# Patient Record
Sex: MALE | Race: WHITE | NOT HISPANIC OR LATINO | Employment: FULL TIME | URBAN - METROPOLITAN AREA
[De-identification: names, ages, dates, MRNs, and addresses within clinical notes are randomized per-mention and may not be internally consistent; named-entity substitution may affect disease eponyms.]

---

## 2017-07-26 ENCOUNTER — ALLSCRIPTS OFFICE VISIT (OUTPATIENT)
Dept: OTHER | Facility: OTHER | Age: 47
End: 2017-07-26

## 2017-07-26 DIAGNOSIS — Z00.00 ENCOUNTER FOR GENERAL ADULT MEDICAL EXAMINATION WITHOUT ABNORMAL FINDINGS: ICD-10-CM

## 2017-07-26 DIAGNOSIS — Z12.5 ENCOUNTER FOR SCREENING FOR MALIGNANT NEOPLASM OF PROSTATE: ICD-10-CM

## 2017-12-28 ENCOUNTER — APPOINTMENT (OUTPATIENT)
Dept: LAB | Facility: HOSPITAL | Age: 47
End: 2017-12-28
Payer: COMMERCIAL

## 2017-12-28 DIAGNOSIS — Z00.00 ENCOUNTER FOR GENERAL ADULT MEDICAL EXAMINATION WITHOUT ABNORMAL FINDINGS: ICD-10-CM

## 2017-12-28 DIAGNOSIS — Z12.5 ENCOUNTER FOR SCREENING FOR MALIGNANT NEOPLASM OF PROSTATE: ICD-10-CM

## 2017-12-28 LAB
ALBUMIN SERPL BCP-MCNC: 4 G/DL (ref 3.5–5)
ALP SERPL-CCNC: 69 U/L (ref 46–116)
ALT SERPL W P-5'-P-CCNC: 35 U/L (ref 12–78)
ANION GAP SERPL CALCULATED.3IONS-SCNC: 5 MMOL/L (ref 4–13)
AST SERPL W P-5'-P-CCNC: 23 U/L (ref 5–45)
BASOPHILS # BLD AUTO: 0.03 THOUSANDS/ΜL (ref 0–0.1)
BASOPHILS NFR BLD AUTO: 1 % (ref 0–1)
BILIRUB SERPL-MCNC: 0.66 MG/DL (ref 0.2–1)
BUN SERPL-MCNC: 19 MG/DL (ref 5–25)
CALCIUM SERPL-MCNC: 9.5 MG/DL (ref 8.3–10.1)
CHLORIDE SERPL-SCNC: 104 MMOL/L (ref 100–108)
CHOLEST SERPL-MCNC: 206 MG/DL (ref 50–200)
CO2 SERPL-SCNC: 30 MMOL/L (ref 21–32)
CREAT SERPL-MCNC: 0.95 MG/DL (ref 0.6–1.3)
EOSINOPHIL # BLD AUTO: 0.07 THOUSAND/ΜL (ref 0–0.61)
EOSINOPHIL NFR BLD AUTO: 1 % (ref 0–6)
ERYTHROCYTE [DISTWIDTH] IN BLOOD BY AUTOMATED COUNT: 12.7 % (ref 11.6–15.1)
GFR SERPL CREATININE-BSD FRML MDRD: 96 ML/MIN/1.73SQ M
GLUCOSE SERPL-MCNC: 99 MG/DL (ref 65–140)
HCT VFR BLD AUTO: 45.1 % (ref 36.5–49.3)
HDLC SERPL-MCNC: 94 MG/DL (ref 40–60)
HGB BLD-MCNC: 15.3 G/DL (ref 12–17)
LDLC SERPL CALC-MCNC: 99 MG/DL (ref 0–100)
LYMPHOCYTES # BLD AUTO: 1.93 THOUSANDS/ΜL (ref 0.6–4.47)
LYMPHOCYTES NFR BLD AUTO: 39 % (ref 14–44)
MCH RBC QN AUTO: 31.6 PG (ref 26.8–34.3)
MCHC RBC AUTO-ENTMCNC: 33.9 G/DL (ref 31.4–37.4)
MCV RBC AUTO: 93 FL (ref 82–98)
MONOCYTES # BLD AUTO: 0.42 THOUSAND/ΜL (ref 0.17–1.22)
MONOCYTES NFR BLD AUTO: 8 % (ref 4–12)
NEUTROPHILS # BLD AUTO: 2.55 THOUSANDS/ΜL (ref 1.85–7.62)
NEUTS SEG NFR BLD AUTO: 51 % (ref 43–75)
NRBC BLD AUTO-RTO: 0 /100 WBCS
PLATELET # BLD AUTO: 303 THOUSANDS/UL (ref 149–390)
PMV BLD AUTO: 9.7 FL (ref 8.9–12.7)
POTASSIUM SERPL-SCNC: 4.6 MMOL/L (ref 3.5–5.3)
PROT SERPL-MCNC: 7.7 G/DL (ref 6.4–8.2)
PSA SERPL-MCNC: 1.2 NG/ML (ref 0–4)
RBC # BLD AUTO: 4.84 MILLION/UL (ref 3.88–5.62)
SODIUM SERPL-SCNC: 139 MMOL/L (ref 136–145)
TRIGL SERPL-MCNC: 63 MG/DL
WBC # BLD AUTO: 5.01 THOUSAND/UL (ref 4.31–10.16)

## 2017-12-28 PROCEDURE — 36415 COLL VENOUS BLD VENIPUNCTURE: CPT

## 2017-12-28 PROCEDURE — 80053 COMPREHEN METABOLIC PANEL: CPT

## 2017-12-28 PROCEDURE — G0103 PSA SCREENING: HCPCS

## 2017-12-28 PROCEDURE — 80061 LIPID PANEL: CPT

## 2017-12-28 PROCEDURE — 85025 COMPLETE CBC W/AUTO DIFF WBC: CPT

## 2017-12-29 ENCOUNTER — GENERIC CONVERSION - ENCOUNTER (OUTPATIENT)
Dept: OTHER | Facility: OTHER | Age: 47
End: 2017-12-29

## 2018-01-10 NOTE — PROGRESS NOTES
Assessment    1  Encounter for preventive health examination (V70 0) (Z00 00)   2  Blood tests for routine general physical examination (V72 62) (Z00 00)    Plan  Blood tests for routine general physical examination, Health Maintenance    · (1) CBC/PLT/DIFF; Status:Active; Requested for:[id number];    · (1) COMPREHENSIVE METABOLIC PANEL; Status:Active; Requested for:[id number];    · (1) LIPID PANEL, FASTING; Status:Active; Requested for:64Hgk1430;   Blood tests for routine general physical examination, Health Maintenance, Encounter for  prostate cancer screening    · (1) PSA (SCREEN) (Dx V76 44 Screen for Prostate Cancer); Status:Active; Requested  for:87Wqu7734; Health Maintenance    · Adacel 5-2-15 5 LF-MCG/0 5 Intramuscular Suspension; INJECT 0 5  ML  Intramuscular; To Be Done: [id number]    Discussion/Summary  Impression: health maintenance visit, healthy adult male  Currently, he eats an adequate diet and has an adequate exercise regimen  Prostate cancer screening: the risks and benefits of prostate cancer screening were discussed and PSA was ordered  Colorectal cancer screening: the risks and benefits of colorectal cancer screening were discussed and colorectal cancer screening is not indicated  Screening lab work includes glucose, lipid profile, 25-hydroxyvitamin D and urinalysis  The risks and benefits of immunizations were discussed and immunizations are needed  He was advised to be evaluated by an ophthalmologist  Advice and education were given regarding nutrition, aerobic exercise, sunscreen use and self skin examination  Patient given lab requisition for fasting labs as above  Will heed results  Patient received updated Tdap Vaccine today  Discussed proper nutrition and regular aerobic exercise  Patient to return to the office in 1 year  The treatment plan was reviewed with the patient/guardian   The patient/guardian understands and agrees with the treatment plan      Chief Complaint  Physical History of Present Illness  HM, Adult Male: The patient is being seen for a health maintenance evaluation  General Health: The patient's health since the last visit is described as good  He has regular dental visits  He complains of vision problems  Vision care includes wearing reading glasses  The patient complains of difficulty reading  He denies hearing loss  Immunizations status: not up to date  Lifestyle:  He does not have a healthy diet  He does not have any weight concerns  He does not exercise regularly  He does not use tobacco  He consumes alcohol  He reports occasional alcohol use  He denies drug use  Screening:   metabolic screening reviewed and updated  HPI: Patient is a 71-year-old male who is here for annual physical exam he is not fasting today  Patient has been doing well overall  No regular exercise program although patient remains very physically active at work as a  and doing yard work  Patient's wife was concerned about possible weight loss as his pants were fitting looser although on our scale there is been no discernible weight loss  Patient admits to family history of heart disease and diabetes but denies family history of colon cancer or prostate cancer  Review of Systems    Constitutional: no fever, not feeling poorly, no chills and not feeling tired  Eyes: No complaints of eye pain, no red eyes, no discharge from eyes, no itchy eyes  ENT: no complaints of earache, no hearing loss, no nosebleeds, no nasal discharge, no sore throat, no hoarseness  Cardiovascular: no chest pain, no intermittent leg claudication, no palpitations and no extremity edema  Respiratory: No complaints of shortness of breath, no wheezing, no cough, no SOB on exertion, no orthopnea or PND  Gastrointestinal: No complaints of abdominal pain, no constipation, no nausea or vomiting, no diarrhea or bloody stools  Genitourinary: no dysuria, no urinary hesitancy and no nocturia  Musculoskeletal: No complaints of arthralgia, no myalgias, no joint swelling or stiffness, no limb pain or swelling  Integumentary: No complaints of skin rash or skin lesions, no itching, no skin wound, no dry skin  Neurological: no headache, no numbness, no tingling, no dizziness, no limb weakness, no fainting and no difficulty walking  Psychiatric: no anxiety, no sleep disturbances and no depression  Endocrine: no muscle weakness and no feelings of weakness  Hematologic/Lymphatic: No complaints of swollen glands, no swollen glands in the neck, does not bleed easily, no easy bruising  Active Problems    1  Encounter for removal of sutures (V58 32) (Z48 02)   2  Laceration of face (873 40) (S01 81XA)   3  Tick bite (919 4,E906 4) (W57 XXXA)    Past Medical History    · History of Acute pharyngitis (462) (J02 9)   · History of Acute serous otitis media, unspecified laterality   · History of Acute sinusitis (461 9) (J01 90)   · History of Atopic dermatitis (691 8) (L20 9)   · History of Impacted cerumen, unspecified laterality   · History of Sore throat (462) (J02 9)   · History of Strain of thoracic region (847 1) (S29 019A)    Surgical History    · History of Surgery Vas Deferens Vasectomy    Family History  Maternal Grandmother    · Family history of Diabetes Mellitus (V18 0)  Maternal Uncle    · Family history of Diabetes Mellitus (V18 0)  Family History    · Denied: Family history of Colon Cancer   · Denied: Family history of Myocardial Infarction Arrhythmias   · Denied: Family history of Prostate Cancer   · Denied: Family history of Stroke Syndrome    Social History    · Being A Social Drinker   · Caffeine Use   · 2 cups coffee/d   · Denied: History of Drug use   · Never A Smoker    Current Meds   1  No Reported Medications Recorded    Allergies    1   No Known Drug Allergies    Vitals   Recorded: [id number] 04:31PM Recorded: 39The7372 04:00PM   Temperature  98 2 F   Heart Rate 72    Respiration 16    Systolic 975 201   Diastolic 74 64   Height  6 ft 1 in   Weight  198 lb    BMI Calculated  26 12   BSA Calculated  2 14     Physical Exam    Constitutional   General appearance: No acute distress, well appearing and well nourished  Eyes   Conjunctiva and lids: No swelling, erythema, or discharge  Ears, Nose, Mouth, and Throat   External inspection of ears and nose: Normal     Otoscopic examination: Tympanic membrance translucent with normal light reflex  Canals patent without erythema  Oropharynx: Normal with no erythema, edema, exudate or lesions  Pulmonary   Respiratory effort: No increased work of breathing or signs of respiratory distress  Auscultation of lungs: Clear to auscultation  Cardiovascular   Auscultation of heart: Normal rate and rhythm, normal S1 and S2, without murmurs  Examination of extremities for edema and/or varicosities: Normal     Abdomen   Abdomen: Non-tender, no masses  Lymphatic   Palpation of lymph nodes in neck: No lymphadenopathy  Musculoskeletal   Gait and station: Normal     Inspection/palpation of joints, bones, and muscles: Normal     Skin   Skin and subcutaneous tissue: Normal without rashes or lesions  Psychiatric   Orientation to person, place and time: Normal     Mood and affect: Normal        Results/Data  PHQ-2 Adult Depression Screening 34Lln7216 04:04PM User, s     Test Name Result Flag Reference   PHQ-2 Adult Depression Score 2     Over the last two weeks, how often have you been bothered by any of the following problems?   Little interest or pleasure in doing things: Several days - 1  Feeling down, depressed, or hopeless: Several days - 1   PHQ-2 Adult Depression Screening Negative         Signatures   Electronically signed by : Karel Mckoy DO; Jul 26 2017  4:44PM EST                       (Author)

## 2018-01-15 VITALS
WEIGHT: 198 LBS | RESPIRATION RATE: 16 BRPM | DIASTOLIC BLOOD PRESSURE: 74 MMHG | HEART RATE: 72 BPM | SYSTOLIC BLOOD PRESSURE: 110 MMHG | TEMPERATURE: 98.2 F | HEIGHT: 73 IN | BODY MASS INDEX: 26.24 KG/M2

## 2018-01-17 NOTE — RESULT NOTES
Message   Tick negative for Lyme disease  Verified Results  (Q) TICK ID W/REFLEX TO LYME DISEASE DNA 25Apr2016 12:00AM Destiny Jin     Test Name Result Flag Reference   TICK ID W/REFLEX TO LYME$DISEASE DNA      Ixodes scapularis ([place] Tick)     LYME DISEASE ([facility]) DNA, QL, TICK 25Apr2016 12:00AM Destiny Jin     Test Name Result Flag Reference   LYME DISEASE ([place] [facility]) DNA, QL, TICK NOT DETECTED     REFERENCE RANGE: NOT DETECTED     This test was developed and its analytical performance  characteristics have been determined by [employer]  It has not been cleared or approved by the [place]  Food and  Drug Administration  The FDA has determined that such  clearance or approval is not necessary  This assay has  been validated pursuant to the CLIA regulations and is  used for clinical purposes

## 2018-01-23 NOTE — RESULT NOTES
Discussion/Summary   Labs are normal      Verified Results  (1) CBC/PLT/DIFF [id number] 04:57PM Laura Mcleod Order Number: [id number]     Test Name Result Flag Reference   WBC COUNT 5 01 Thousand/uL  4 31-10 16   RBC COUNT 4 84 Million/uL  3 88-5 62   HEMOGLOBIN 15 3 g/dL  12 0-17 0   HEMATOCRIT 45 1 %  36 5-49 3   MCV 93 fL  82-98   MCH 31 6 pg  26 8-34 3   MCHC 33 9 g/dL  31 4-37 4   RDW 12 7 %  11 6-15 1   MPV 9 7 fL  8 9-12 7   PLATELET COUNT 079 Thousands/uL  149-390   nRBC AUTOMATED 0 /100 WBCs     NEUTROPHILS RELATIVE PERCENT 51 %  43-75   LYMPHOCYTES RELATIVE PERCENT 39 %  14-44   MONOCYTES RELATIVE PERCENT 8 %  4-12   EOSINOPHILS RELATIVE PERCENT 1 %  0-6   BASOPHILS RELATIVE PERCENT 1 %  0-1   NEUTROPHILS ABSOLUTE COUNT 2 55 Thousands/? ??L  1 85-7 62   LYMPHOCYTES ABSOLUTE COUNT 1 93 Thousands/? ??L  0 60-4 47   MONOCYTES ABSOLUTE COUNT 0 42 Thousand/? ??L  0 17-1 22   EOSINOPHILS ABSOLUTE COUNT 0 07 Thousand/? ??L  0 00-0 61   BASOPHILS ABSOLUTE COUNT 0 03 Thousands/? ??L  0 00-0 10     (1) COMPREHENSIVE METABOLIC PANEL [id number] 92:14LO Laura Mcleod Order Number: [id number]     Test Name Result Flag Reference   GLUCOSE,RANDM 99 mg/dL     If the patient is fasting, the ADA then defines impaired fasting glucose as > 100 mg/dL and diabetes as > or equal to 123 mg/dL  Specimen collection should occur prior to Sulfasalazine administration due to the potential for falsely depressed results  Specimen collection should occur prior to Sulfapyridine administration due to the potential for falsely elevated results     SODIUM 139 mmol/L  136-145   POTASSIUM 4 6 mmol/L  3 5-5 3   CHLORIDE 104 mmol/L  100-108   CARBON DIOXIDE 30 mmol/L  21-32   ANION GAP (CALC) 5 mmol/L  4-13   BLOOD UREA NITROGEN 19 mg/dL  5-25   CREATININE 0 95 mg/dL  0 60-1 30   Standardized to IDMS reference method   CALCIUM 9 5 mg/dL  8 3-10 1   BILI, TOTAL 0 66 mg/dL  0 20-1 00   ALK PHOSPHATAS 69 U/L    ALT (SGPT) 35 U/L  12-78   Specimen collection should occur prior to Sulfasalazine and/or Sulfapyridine administration due to the potential for falsely depressed results  AST(SGOT) 23 U/L  5-45   Specimen collection should occur prior to Sulfasalazine administration due to the potential for falsely depressed results  ALBUMIN 4 0 g/dL  3 5-5 0   TOTAL PROTEIN 7 7 g/dL  6 4-8 2   eGFR 96 ml/min/1 73sq [facility] Disease Education Program recommendations are as follows:  GFR calculation is accurate only with a steady state creatinine  Chronic Kidney disease less than 60 ml/min/1 73 sq  meters  Kidney failure less than 15 ml/min/1 73 sq  meters  (1) LIPID PANEL, FASTING 28Dec2017 04:57PM SoftLayermeaghan Simon Order Number: [id number]     Test Name Result Flag Reference   CHOLESTEROL 206 mg/dL H    HDL,DIRECT 94 mg/dL H 40-60   Specimen collection should occur prior to Metamizole administration due to the potential for falsley depressed results  LDL CHOLESTEROL CALCULATED 99 mg/dL  0-100   Triglyceride:        Normal <150 mg/dl   Borderline High 150-199 mg/dl   High 200-499 mg/dl   Very High >499 mg/dl      Cholesterol:       Desirable <200 mg/dl    Borderline High 200-239 mg/dl    High >239 mg/dl      HDL Cholesterol:       High>59 mg/dL    Low <41 mg/dL      This screening LDL is a calculated result  It does not have the accuracy of the Direct Measured LDL in the monitoring of patients with hyperlipidemia and/or statin therapy  Direct Measure LDL (PTW260) must be ordered separately in these patients  TRIGLYCERIDES 63 mg/dL  <=150   Specimen collection should occur prior to N-Acetylcysteine or Metamizole administration due to the potential for falsely depressed results       (1) PSA (SCREEN) (Dx V76 44 Screen for Prostate Cancer) 28Dec2017 04:57PM SoftLayermeaghan Simon Order Number: [id number]     Test Name Result Flag Reference   PROSTATE SPECIFIC ANTIGEN 1 2 ng/mL  0 0-4 0   American Urological Association Guidelines define biochemical recurrence of prostate cancer as a detectable or rising PSA value post-radical prostatectomy that is greater than or equal to 0 2 ng/mL with a second confirmatory level of greater than or equal to 0 2 ng/mL

## 2018-07-09 ENCOUNTER — HOSPITAL ENCOUNTER (OUTPATIENT)
Dept: NON INVASIVE DIAGNOSTICS | Facility: HOSPITAL | Age: 48
Discharge: HOME | End: 2018-07-09
Payer: COMMERCIAL

## 2018-07-09 ENCOUNTER — OFFICE VISIT (OUTPATIENT)
Dept: FAMILY MEDICINE CLINIC | Facility: CLINIC | Age: 48
End: 2018-07-09
Payer: COMMERCIAL

## 2018-07-09 VITALS
RESPIRATION RATE: 16 BRPM | HEIGHT: 72 IN | BODY MASS INDEX: 27.09 KG/M2 | DIASTOLIC BLOOD PRESSURE: 80 MMHG | TEMPERATURE: 98.9 F | SYSTOLIC BLOOD PRESSURE: 124 MMHG | HEART RATE: 72 BPM | WEIGHT: 200 LBS

## 2018-07-09 DIAGNOSIS — I80.9 PHLEBITIS: Primary | ICD-10-CM

## 2018-07-09 DIAGNOSIS — I80.9 PHLEBITIS: ICD-10-CM

## 2018-07-09 PROCEDURE — 93971 EXTREMITY STUDY: CPT | Performed by: SURGERY

## 2018-07-09 PROCEDURE — 93971 EXTREMITY STUDY: CPT

## 2018-07-09 PROCEDURE — 99213 OFFICE O/P EST LOW 20 MIN: CPT | Performed by: FAMILY MEDICINE

## 2018-07-09 RX ORDER — CEPHALEXIN 500 MG/1
500 CAPSULE ORAL
COMMUNITY
Start: 2018-07-03 | End: 2018-07-10

## 2018-07-09 NOTE — PROGRESS NOTES
Assessment/Plan:    Patient will be scheduled for venous duplex of the lower extremities stat  Will heed results  If negative for DVT then recommend Motrin, rest, leg elevation and support stockings  If positive for DVT will require anticoagulation  This was discussed with patient and wife at length  Return to the office in 1 week or sooner rafa Manzano [facility] Diagnoses and all orders for this visit:    Phlebitis  Comments:  Schedule venous duplex of the lower extremities stat  Orders:  -     VAS lower limb venous duplex study, complete bilateral; Future    Other orders  -     cephalexin (KEFLEX) 500 mg capsule; Take 500 mg by mouth  -     diclofenac sodium (VOLTAREN) 1 %; Apply 1 application topically          Subjective:      Patient ID: Long Fishman is a 53 y o  male  Patient is being seen in follow-up from an urgent care visit at [facility] on 07/03/2018 for diagnosis of left leg phlebitis  Patient was treated with cephalexin and diclofenac gel  Patient complains of left leg pain and swelling for the past 10 days  He admits to pain initially anterior long shin and medially into the calf and now behind the knee  Patient has treated this with warm compresses and leg elevation without significant improvement  Patient has been wearing support stockings with some improvement in the swelling  Patient works as a  standing on concrete floor  He denies any specific injury  He denies prior history of DVT  Leg Pain    The incident occurred more than 1 week ago  There was no injury mechanism  The pain is present in the left leg  The quality of the pain is described as stabbing  The pain has been constant since onset  Pertinent negatives include no inability to bear weight, muscle weakness, numbness or tingling  The symptoms are aggravated by weight bearing  He has tried heat, rest, elevation and NSAIDs for the symptoms  The treatment provided no relief         The following portions of the patient's history were reviewed and updated as appropriate: allergies, current medications, past family history, past medical history, past social history, past surgical history and problem list     Review of Systems   Neurological: Negative for tingling and numbness  Objective:      /80   Pulse 72   Temp 98 9 °F (37 2 °C) (Tympanic)   Resp 16   Ht 6' (1 829 m)   Wt 90 7 kg (200 lb)   BMI 27 12 kg/m²          Physical Exam   Constitutional: He is oriented to person, place, and time  He appears well-developed and well-nourished  No distress  HENT:   Head: Normocephalic  Mouth/Throat: Oropharynx is clear and moist    Eyes: Conjunctivae are normal  No scleral icterus  Neck: Neck supple  Cardiovascular: Normal rate and regular rhythm  Pulmonary/Chest: Effort normal and breath sounds normal    Abdominal: Soft  There is no tenderness  Musculoskeletal: He exhibits edema and tenderness  Left leg reveals tenderness anterior long the shin, proximal medial thigh and popliteal area  Positive erythema, swelling and induration of the vein  Negative calf tenderness and negative Hiram's sign  Lymphadenopathy:     He has no cervical adenopathy  Neurological: He is alert and oriented to person, place, and time  Skin: Skin is warm and dry  Psychiatric: He has a normal mood and affect

## 2018-07-10 ENCOUNTER — TELEPHONE (OUTPATIENT)
Dept: FAMILY MEDICINE CLINIC | Facility: CLINIC | Age: 48
End: 2018-07-10

## 2018-07-10 NOTE — TELEPHONE ENCOUNTER
Natasha Kovacs from Vascular lab left voicemail for Vas lower limb venous duplex and states no DVT  There is acute superficial thrombophlebitis in the great saphenous vein from  the knee to the mid calf

## 2018-09-13 ENCOUNTER — OFFICE VISIT (OUTPATIENT)
Dept: FAMILY MEDICINE CLINIC | Facility: CLINIC | Age: 48
End: 2018-09-13
Payer: COMMERCIAL

## 2018-09-13 VITALS
HEIGHT: 73 IN | HEART RATE: 83 BPM | DIASTOLIC BLOOD PRESSURE: 70 MMHG | WEIGHT: 202 LBS | BODY MASS INDEX: 26.77 KG/M2 | TEMPERATURE: 96.7 F | RESPIRATION RATE: 16 BRPM | SYSTOLIC BLOOD PRESSURE: 100 MMHG | OXYGEN SATURATION: 98 %

## 2018-09-13 DIAGNOSIS — J02.9 PHARYNGITIS, UNSPECIFIED ETIOLOGY: Primary | ICD-10-CM

## 2018-09-13 DIAGNOSIS — R50.9 FEVER AND CHILLS: ICD-10-CM

## 2018-09-13 LAB — S PYO AG THROAT QL: NEGATIVE

## 2018-09-13 PROCEDURE — 1036F TOBACCO NON-USER: CPT | Performed by: FAMILY MEDICINE

## 2018-09-13 PROCEDURE — 87880 STREP A ASSAY W/OPTIC: CPT | Performed by: FAMILY MEDICINE

## 2018-09-13 PROCEDURE — 3008F BODY MASS INDEX DOCD: CPT | Performed by: FAMILY MEDICINE

## 2018-09-13 PROCEDURE — 99213 OFFICE O/P EST LOW 20 MIN: CPT | Performed by: FAMILY MEDICINE

## 2018-09-13 RX ORDER — AZITHROMYCIN 250 MG/1
TABLET, FILM COATED ORAL
Qty: 6 TABLET | Refills: 0 | Status: SHIPPED | OUTPATIENT
Start: 2018-09-13 | End: 2018-09-17

## 2018-09-13 NOTE — PROGRESS NOTES
Assessment/Plan:    Discussed diagnostic and treatment options with patient and wife  Rapid strep test is negative  Patient will be started on a Z-Abdoulaye and instructed to continue ibuprofen 600 mg every 6 hours p lon Bar Patient encouraged to increase fluids and rest   Recommend chest x-ray if fever persists for 24-48 hours  Return to the office in 1 week or sooner rafa Bar Diagnoses and all orders for this visit:    Pharyngitis, unspecified etiology  Comments:  Rapid strep test is negative  Z-Abdoulaye and ibuprofen p r n   Increase fluids and rest   Orders:  -     POCT rapid strepA  -     azithromycin (ZITHROMAX) 250 mg tablet; Take 2 tablets today then 1 tablet daily x 4 days    Fever and chills  Comments:  Ibuprofen p r n     Chest x-ray in 1-2 days if not improving  Orders:  -     XR chest pa & lateral; Future    Other orders  -     ibuprofen (ADVIL,MOTRIN) 100 MG tablet; Take 100 mg by mouth every 6 (six) hours as needed for mild pain          Subjective:      [facility] 53 y o  [facility]  Patient started 4 days ago with sore throat and the past 3 days complains of fever to 101  He admits to chills, sweats and body aches  Patient admits to mild congestion and nonproductive cough  Patient has been tolerating fluids well  He has been treating this with ibuprofen every 6 hours with some relief  Sore Throat    This is a new problem  The current episode started in the past 7 days  The maximum temperature recorded prior to his arrival was 101 - 101 9 F  Associated symptoms include congestion, coughing and headaches  Pertinent negatives include no abdominal pain, diarrhea, ear pain, hoarse voice, plugged ear sensation, neck pain, shortness of breath, swollen glands, trouble swallowing or vomiting  He has had no exposure to strep  He has tried NSAIDs and cool liquids for the symptoms  The treatment provided mild relief     Generalized Body Aches   Associated symptoms include congestion, headaches, a sore throat and coughing  Pertinent negatives include no ear pain, swollen glands, shortness of breath, abdominal pain, diarrhea, vomiting or neck pain  The following portions of the patient's history were reviewed and updated as appropriate: allergies, current medications, past family history, past medical history, past social history, past surgical history and problem list     Review of Systems   HENT: Positive for congestion and sore throat  Negative for ear pain, hoarse voice and trouble swallowing  Respiratory: Positive for cough  Negative for shortness of breath  Gastrointestinal: Negative for abdominal pain, diarrhea and vomiting  Musculoskeletal: Negative for neck pain  Neurological: Positive for headaches  Objective:      /70 (BP Location: Left arm, Patient Position: Sitting, Cuff Size: Adult)   Pulse 83   Temp (!) 96 7 °F (35 9 °C)   Resp 16   Ht 6' 0 83" (1 85 m)   Wt 91 6 kg (202 lb)   SpO2 98%   BMI 26 77 kg/m²          Physical Exam   Constitutional: He is oriented to person, place, and time  He appears well-developed and well-nourished  No distress  HENT:   Head: Normocephalic  Right Ear: External ear normal    Left Ear: External ear normal    Mouth/Throat: No oropharyngeal exudate  Mild turbinates swelling with mucoid drainage  Throat positive erythema  Mucous membranes moist    Eyes: Conjunctivae are normal  No scleral icterus  Neck: Neck supple  Cardiovascular: Normal rate and regular rhythm  Pulmonary/Chest: Effort normal and breath sounds normal  No respiratory distress  He has no wheezes  Abdominal: Soft  There is no tenderness  Musculoskeletal: He exhibits no edema  Lymphadenopathy:     He has no cervical adenopathy  Neurological: He is alert and oriented to person, place, and time  Skin: Skin is warm and dry  He is not diaphoretic  Psychiatric: He has a normal mood and affect

## 2019-11-20 ENCOUNTER — OFFICE VISIT (OUTPATIENT)
Dept: FAMILY MEDICINE CLINIC | Facility: CLINIC | Age: 49
End: 2019-11-20
Payer: COMMERCIAL

## 2019-11-20 VITALS
HEART RATE: 84 BPM | SYSTOLIC BLOOD PRESSURE: 116 MMHG | WEIGHT: 192 LBS | RESPIRATION RATE: 16 BRPM | TEMPERATURE: 98.9 F | HEIGHT: 74 IN | OXYGEN SATURATION: 98 % | DIASTOLIC BLOOD PRESSURE: 70 MMHG | BODY MASS INDEX: 24.64 KG/M2

## 2019-11-20 DIAGNOSIS — J06.9 UPPER RESPIRATORY TRACT INFECTION, UNSPECIFIED TYPE: Primary | ICD-10-CM

## 2019-11-20 PROCEDURE — 1036F TOBACCO NON-USER: CPT | Performed by: FAMILY MEDICINE

## 2019-11-20 PROCEDURE — 99213 OFFICE O/P EST LOW 20 MIN: CPT | Performed by: FAMILY MEDICINE

## 2019-11-20 RX ORDER — AZITHROMYCIN 250 MG/1
TABLET, FILM COATED ORAL
Qty: 6 TABLET | Refills: 0 | Status: SHIPPED | OUTPATIENT
Start: 2019-11-20 | End: 2019-11-24

## 2019-11-20 NOTE — PROGRESS NOTES
Assessment/Plan:  Patient was started on a Z-Abdoulaye and instructed to take Robitussin or Mucinex p r n  He may continue Advil p r n  He is encouraged to drink plenty of fluids and rest   Return to the office in 1 week or sooner p r n  Hermilo Kovacs Diagnoses and all orders for this visit:    Upper respiratory tract infection, unspecified type  -     azithromycin (ZITHROMAX) 250 mg tablet; Take 2 tablets today then 1 tablet daily x 4 days          Subjective:      Patient ID: Ute Lutz is a 51 y o  male  Patient complains of cold symptoms for the past 4 days  He complains of sore throat, nasal congestion and cough  He admits to headache and feeling feverish with chills and sweats  He has been treating this with Advil with some relief  URI    This is a new problem  The current episode started in the past 7 days  The problem has been gradually worsening  There has been no fever  Associated symptoms include congestion, coughing, headaches, rhinorrhea, sinus pain, sneezing and a sore throat  Pertinent negatives include no ear pain, plugged ear sensation or wheezing  He has tried NSAIDs and increased fluids for the symptoms  The treatment provided mild relief  The following portions of the patient's history were reviewed and updated as appropriate: allergies, current medications, past family history, past medical history, past social history, past surgical history and problem list     Review of Systems   HENT: Positive for congestion, rhinorrhea, sinus pain, sneezing and sore throat  Negative for ear pain  Respiratory: Positive for cough  Negative for wheezing  Neurological: Positive for headaches           Objective:      /70 (BP Location: Left arm, Patient Position: Sitting, Cuff Size: Adult)   Pulse 84   Temp 98 9 °F (37 2 °C) (Oral)   Resp 16   Ht 6' 1 62" (1 87 m)   Wt 87 1 kg (192 lb)   SpO2 98%   BMI 24 91 kg/m²          Physical Exam   Constitutional: He is oriented to person, place, and time  He appears well-developed and well-nourished  HENT:   Head: Normocephalic  Right Ear: External ear normal    Left Ear: External ear normal    Turbinates swelling with mucoid drainage  Throat postnasal drainage and erythema  Mucous membranes moist    Eyes: Conjunctivae are normal  No scleral icterus  Neck: Neck supple  Cardiovascular: Normal rate and regular rhythm  Pulmonary/Chest: Effort normal and breath sounds normal    Abdominal: Soft  There is no tenderness  Musculoskeletal: He exhibits no edema  Lymphadenopathy:     He has no cervical adenopathy  Neurological: He is alert and oriented to person, place, and time  Skin: Skin is warm and dry  Psychiatric: He has a normal mood and affect

## 2020-10-29 ENCOUNTER — CLINICAL SUPPORT (OUTPATIENT)
Dept: FAMILY MEDICINE CLINIC | Facility: CLINIC | Age: 50
End: 2020-10-29
Payer: COMMERCIAL

## 2020-10-29 DIAGNOSIS — Z23 NEED FOR INFLUENZA VACCINATION: Primary | ICD-10-CM

## 2020-10-29 PROCEDURE — 90471 IMMUNIZATION ADMIN: CPT

## 2020-10-29 PROCEDURE — 90686 IIV4 VACC NO PRSV 0.5 ML IM: CPT

## 2021-08-24 ENCOUNTER — RA CDI HCC (OUTPATIENT)
Dept: OTHER | Facility: HOSPITAL | Age: 51
End: 2021-08-24

## 2021-08-24 NOTE — PROGRESS NOTES
[facility] [[email or web address]] 75  coding opportunities       Chart reviewed, no opportunity found: CHART REVIEWED, NO OPPORTUNITY FOUND                        Patients insurance company: [employer] (Medicare Advantage and Commercial)

## 2021-08-31 ENCOUNTER — OFFICE VISIT (OUTPATIENT)
Dept: FAMILY MEDICINE CLINIC | Facility: CLINIC | Age: 51
End: 2021-08-31
Payer: COMMERCIAL

## 2021-08-31 VITALS
OXYGEN SATURATION: 99 % | HEART RATE: 69 BPM | HEIGHT: 72 IN | BODY MASS INDEX: 25.3 KG/M2 | SYSTOLIC BLOOD PRESSURE: 118 MMHG | TEMPERATURE: 97.6 F | WEIGHT: 186.8 LBS | DIASTOLIC BLOOD PRESSURE: 70 MMHG

## 2021-08-31 DIAGNOSIS — R53.83 FATIGUE, UNSPECIFIED TYPE: ICD-10-CM

## 2021-08-31 DIAGNOSIS — Z12.5 SCREENING FOR PROSTATE CANCER: ICD-10-CM

## 2021-08-31 DIAGNOSIS — Z12.11 COLON CANCER SCREENING: ICD-10-CM

## 2021-08-31 DIAGNOSIS — Z00.00 WELL ADULT EXAM: Primary | ICD-10-CM

## 2021-08-31 PROCEDURE — 1036F TOBACCO NON-USER: CPT | Performed by: FAMILY MEDICINE

## 2021-08-31 PROCEDURE — 99396 PREV VISIT EST AGE 40-64: CPT | Performed by: FAMILY MEDICINE

## 2021-08-31 PROCEDURE — 3725F SCREEN DEPRESSION PERFORMED: CPT | Performed by: FAMILY MEDICINE

## 2021-08-31 PROCEDURE — 3008F BODY MASS INDEX DOCD: CPT | Performed by: FAMILY MEDICINE

## 2021-08-31 NOTE — PROGRESS NOTES
BMI Counseling: Body mass index is 25 3 kg/m²  The BMI is above normal  Nutrition recommendations include reducing portion sizes

## 2021-08-31 NOTE — PROGRESS NOTES
Assessment/Plan:  Recommend lab testing  Recommend colonoscopy for colon cancer screen     1  Well adult exam  -     CBC and differential  -     Comprehensive metabolic panel  -     TSH, 3rd generation with Free T4 reflex; Future  -     Lipid Panel with Direct LDL reflex    2  Colon cancer screening  -     Ambulatory referral for colonoscopy; Future    3  Screening for prostate cancer  -     PSA, Total Screen; Future    4  Fatigue, unspecified type  -     Lyme Antibody Profile with reflex to WB; Future          Subjective:      Patient ID: Francois Gonzalez is a 49 y o  male  Patient is here today for well check  Generally feeling well with the exception of occasional fatigue that he believes might be from stress  The following portions of the patient's history were reviewed and updated as appropriate: allergies, current medications, past family history, past medical history, past social history, past surgical history, and problem list     Review of Systems   Constitutional: Negative  HENT: Negative  Eyes: Negative  Respiratory: Negative  Cardiovascular: Negative  Gastrointestinal: Negative  Endocrine: Negative  Genitourinary: Negative  Musculoskeletal: Negative  Skin: Negative  Allergic/Immunologic: Negative  Neurological: Negative  Hematological: Negative  Psychiatric/Behavioral: Negative  Objective:      /70 (BP Location: Left arm, Patient Position: Sitting, Cuff Size: Standard)   Pulse 69   Temp 97 6 °F (36 4 °C) (Temporal)   Ht 6' 0 05" (1 83 m)   Wt 84 7 kg (186 lb 12 8 oz)   SpO2 99%   BMI 25 30 kg/m²          Physical Exam  Vitals reviewed  Constitutional:       Appearance: He is well-developed  HENT:      Head: Normocephalic and atraumatic  Right Ear: External ear normal  Tympanic membrane is not erythematous or bulging  Left Ear: External ear normal  Tympanic membrane is not erythematous or bulging        Nose: Nose normal       Mouth/Throat:      Mouth: No oral lesions  Pharynx: No oropharyngeal exudate  Eyes:      General: No scleral icterus  Right eye: No discharge  Left eye: No discharge  Conjunctiva/sclera: Conjunctivae normal    Neck:      Thyroid: No thyromegaly  Cardiovascular:      Rate and Rhythm: Normal rate and regular rhythm  Heart sounds: Normal heart sounds  No murmur heard  No friction rub  No gallop  Pulmonary:      Effort: Pulmonary effort is normal  No respiratory distress  Breath sounds: No wheezing or rales  Chest:      Chest wall: No tenderness  Abdominal:      General: Bowel sounds are normal  There is no distension  Palpations: Abdomen is soft  There is no mass  Tenderness: There is no abdominal tenderness  There is no guarding or rebound  Musculoskeletal:         General: No tenderness or deformity  Normal range of motion  Cervical back: Normal range of motion and neck supple  Lymphadenopathy:      Cervical: No cervical adenopathy  Skin:     General: Skin is warm and dry  Coloration: Skin is not pale  Findings: No erythema or rash  Neurological:      Mental Status: He is alert and oriented to person, place, and time  Cranial Nerves: No cranial nerve deficit  Motor: No abnormal muscle tone  Coordination: Coordination normal       Deep Tendon Reflexes: Reflexes are normal and symmetric     Psychiatric:         Behavior: Behavior normal

## 2021-09-30 ENCOUNTER — TELEMEDICINE (OUTPATIENT)
Dept: FAMILY MEDICINE CLINIC | Facility: CLINIC | Age: 51
End: 2021-09-30
Payer: COMMERCIAL

## 2021-09-30 DIAGNOSIS — J01.00 ACUTE NON-RECURRENT MAXILLARY SINUSITIS: Primary | ICD-10-CM

## 2021-09-30 PROCEDURE — 99213 OFFICE O/P EST LOW 20 MIN: CPT | Performed by: FAMILY MEDICINE

## 2021-09-30 PROCEDURE — U0003 INFECTIOUS AGENT DETECTION BY NUCLEIC ACID (DNA OR RNA); SEVERE ACUTE RESPIRATORY SYNDROME CORONAVIRUS 2 (SARS-COV-2) (CORONAVIRUS DISEASE [COVID-19]), AMPLIFIED PROBE TECHNIQUE, MAKING USE OF HIGH THROUGHPUT TECHNOLOGIES AS DESCRIBED BY CMS-2020-01-R: HCPCS | Performed by: FAMILY MEDICINE

## 2021-09-30 PROCEDURE — U0005 INFEC AGEN DETEC AMPLI PROBE: HCPCS | Performed by: FAMILY MEDICINE

## 2021-09-30 RX ORDER — AZITHROMYCIN 250 MG/1
TABLET, FILM COATED ORAL
Qty: 6 TABLET | Refills: 0 | Status: SHIPPED | OUTPATIENT
Start: 2021-09-30 | End: 2021-10-07

## 2021-09-30 RX ORDER — IPRATROPIUM BROMIDE 21 UG/1
2 SPRAY, METERED NASAL EVERY 12 HOURS
Qty: 30 ML | Refills: 0 | Status: SHIPPED | OUTPATIENT
Start: 2021-09-30

## 2021-09-30 NOTE — PROGRESS NOTES
Virtual Regular Visit    Verification of patient location:    Patient is located in the following state in which I hold an active license [place]      Assessment/Plan:  Recommend staying isolated home until we have a COVID test results back  He will call with any new persisting or worsening symptoms  Problem List Items Addressed This Visit     None      Visit Diagnoses     Acute non-recurrent maxillary sinusitis    -  Primary    Relevant Medications    azithromycin (ZITHROMAX) 250 mg tablet    ipratropium (ATROVENT) 0 03 % nasal spray    Other Relevant Orders    Novel Coronavirus (Covid-19),PCR [facility] - Collected at [facility] or [facility] Now               Reason for visit is   Chief Complaint   Patient presents with    Virtual Regular Visit        Encounter provider Michelle Magana DO    Provider located at [address] [place] [address] [phone]      Recent Visits  No visits were found meeting these conditions  Showing recent visits within past 7 days and meeting all other requirements  Today's Visits  Date Type Provider Dept   09/30/21 Telemedicine Michelle Magana DO [facility]   Showing today's visits and meeting all other requirements  Future Appointments  No visits were found meeting these conditions  Showing future appointments within next 150 days and meeting all other requirements       The patient was identified by name and date of birth  Viktoriya Young was informed that this is a telemedicine visit and that the visit is being conducted through [address] [place] [address] Now and patient was informed that this is a secure, HIPAA-compliant platform  He agrees to proceed     My office door was closed  No one else was in the room  He acknowledged consent and understanding of privacy and security of the video platform  The patient has agreed to participate and understands they can discontinue the visit at any time  Patient is aware this is a billable service  Subjective  Jordy Urena is a 49 y o  male for congestion   Patient with sinus congestion for the last 24 hours  He is feeling mildly achy  No fevers  He has dry nonproductive cough  History reviewed  No pertinent past medical history  History reviewed  No pertinent surgical history  Current Outpatient Medications   Medication Sig Dispense Refill    azithromycin (ZITHROMAX) 250 mg tablet Take 2 tablets today then 1 tablet daily x 4 days 6 tablet 0    ibuprofen (ADVIL,MOTRIN) 100 MG tablet Take 100 mg by mouth every 6 (six) hours as needed for mild pain (Patient not taking: Reported on 8/31/2021)      ipratropium (ATROVENT) 0 03 % nasal spray 2 sprays into each nostril every 12 (twelve) hours 30 mL 0     No current facility-administered medications for this visit  No Known Allergies    Review of Systems   Constitutional: Negative  HENT: Positive for congestion and sinus pressure  Eyes: Negative  Respiratory: Negative  Cardiovascular: Negative  Gastrointestinal: Negative  Endocrine: Negative  Genitourinary: Negative  Musculoskeletal: Negative  Skin: Negative  Allergic/Immunologic: Negative  Neurological: Negative  Hematological: Negative  Psychiatric/Behavioral: Negative  Video Exam    There were no vitals filed for this visit  Physical Exam     I spent 15 minutes directly with the patient during this visit    VIRTUAL VISIT DISCLAIMER      Jordy Urena verbally agrees to participate in [employer]  Pt is aware that [employer] could be limited without vital signs or the ability to perform a full hands-on physical exam  Mahad Woods understands he or the provider may request at any time to terminate the video visit and request the patient to seek care or treatment in person

## 2021-11-17 ENCOUNTER — APPOINTMENT (OUTPATIENT)
Dept: LAB | Facility: HOSPITAL | Age: 51
End: 2021-11-17
Payer: COMMERCIAL

## 2021-11-17 DIAGNOSIS — R53.83 FATIGUE, UNSPECIFIED TYPE: ICD-10-CM

## 2021-11-17 DIAGNOSIS — Z12.5 SCREENING FOR PROSTATE CANCER: ICD-10-CM

## 2021-11-17 DIAGNOSIS — Z00.00 WELL ADULT EXAM: ICD-10-CM

## 2021-11-17 LAB
ALBUMIN SERPL BCP-MCNC: 4.1 G/DL (ref 3–5.2)
ALP SERPL-CCNC: 55 U/L (ref 43–122)
ALT SERPL W P-5'-P-CCNC: 28 U/L
ANION GAP SERPL CALCULATED.3IONS-SCNC: 2 MMOL/L (ref 5–14)
AST SERPL W P-5'-P-CCNC: 36 U/L (ref 17–59)
BASOPHILS # BLD AUTO: 0 THOUSANDS/ΜL (ref 0–0.1)
BASOPHILS NFR BLD AUTO: 1 % (ref 0–1)
BILIRUB SERPL-MCNC: 0.65 MG/DL
BUN SERPL-MCNC: 19 MG/DL (ref 5–25)
CALCIUM SERPL-MCNC: 9.7 MG/DL (ref 8.4–10.2)
CHLORIDE SERPL-SCNC: 104 MMOL/L (ref 97–108)
CHOLEST SERPL-MCNC: 181 MG/DL
CO2 SERPL-SCNC: 31 MMOL/L (ref 22–30)
CREAT SERPL-MCNC: 0.91 MG/DL (ref 0.7–1.5)
EOSINOPHIL # BLD AUTO: 0.1 THOUSAND/ΜL (ref 0–0.4)
EOSINOPHIL NFR BLD AUTO: 2 % (ref 0–6)
ERYTHROCYTE [DISTWIDTH] IN BLOOD BY AUTOMATED COUNT: 13.2 %
GFR SERPL CREATININE-BSD FRML MDRD: 98 ML/MIN/1.73SQ M
GLUCOSE P FAST SERPL-MCNC: 95 MG/DL (ref 70–99)
HCT VFR BLD AUTO: 43.4 % (ref 41–53)
HDLC SERPL-MCNC: 81 MG/DL
HGB BLD-MCNC: 14.5 G/DL (ref 13.5–17.5)
LDLC SERPL CALC-MCNC: 89 MG/DL
LYMPHOCYTES # BLD AUTO: 1.9 THOUSANDS/ΜL (ref 0.5–4)
LYMPHOCYTES NFR BLD AUTO: 41 % (ref 25–45)
MCH RBC QN AUTO: 31.9 PG (ref 26–34)
MCHC RBC AUTO-ENTMCNC: 33.3 G/DL (ref 31–36)
MCV RBC AUTO: 96 FL (ref 80–100)
MONOCYTES # BLD AUTO: 0.5 THOUSAND/ΜL (ref 0.2–0.9)
MONOCYTES NFR BLD AUTO: 10 % (ref 1–10)
NEUTROPHILS # BLD AUTO: 2.2 THOUSANDS/ΜL (ref 1.8–7.8)
NEUTS SEG NFR BLD AUTO: 47 % (ref 45–65)
PLATELET # BLD AUTO: 263 THOUSANDS/UL (ref 150–450)
PMV BLD AUTO: 8 FL (ref 8.9–12.7)
POTASSIUM SERPL-SCNC: 4.4 MMOL/L (ref 3.6–5)
PROT SERPL-MCNC: 7.3 G/DL (ref 5.9–8.4)
PSA SERPL-MCNC: 1 NG/ML (ref 0–4)
RBC # BLD AUTO: 4.53 MILLION/UL (ref 4.5–5.9)
SODIUM SERPL-SCNC: 137 MMOL/L (ref 137–147)
TRIGL SERPL-MCNC: 53 MG/DL
TSH SERPL DL<=0.05 MIU/L-ACNC: 1.34 UIU/ML (ref 0.47–4.68)
WBC # BLD AUTO: 4.7 THOUSAND/UL (ref 4.5–11)

## 2021-11-17 PROCEDURE — 84443 ASSAY THYROID STIM HORMONE: CPT

## 2021-11-17 PROCEDURE — 36415 COLL VENOUS BLD VENIPUNCTURE: CPT

## 2021-11-17 PROCEDURE — 80053 COMPREHEN METABOLIC PANEL: CPT | Performed by: FAMILY MEDICINE

## 2021-11-17 PROCEDURE — 80061 LIPID PANEL: CPT | Performed by: FAMILY MEDICINE

## 2021-11-17 PROCEDURE — 86617 LYME DISEASE ANTIBODY: CPT

## 2021-11-17 PROCEDURE — G0103 PSA SCREENING: HCPCS

## 2021-11-17 PROCEDURE — 85025 COMPLETE CBC W/AUTO DIFF WBC: CPT | Performed by: FAMILY MEDICINE

## 2021-11-17 PROCEDURE — 86618 LYME DISEASE ANTIBODY: CPT

## 2021-11-18 ENCOUNTER — TELEPHONE (OUTPATIENT)
Dept: OTHER | Facility: OTHER | Age: 51
End: 2021-11-18

## 2021-11-18 LAB — B BURGDOR IGG+IGM SER-ACNC: 186

## 2021-11-19 LAB

## 2022-02-18 ENCOUNTER — APPOINTMENT (OUTPATIENT)
Dept: RADIOLOGY | Facility: MEDICAL CENTER | Age: 52
End: 2022-02-18
Payer: COMMERCIAL

## 2022-02-18 ENCOUNTER — OFFICE VISIT (OUTPATIENT)
Dept: FAMILY MEDICINE CLINIC | Facility: CLINIC | Age: 52
End: 2022-02-18
Payer: COMMERCIAL

## 2022-02-18 VITALS
WEIGHT: 185 LBS | DIASTOLIC BLOOD PRESSURE: 80 MMHG | TEMPERATURE: 98 F | OXYGEN SATURATION: 98 % | BODY MASS INDEX: 24.52 KG/M2 | SYSTOLIC BLOOD PRESSURE: 130 MMHG | HEIGHT: 73 IN | HEART RATE: 82 BPM

## 2022-02-18 DIAGNOSIS — Z12.11 SCREENING FOR COLON CANCER: ICD-10-CM

## 2022-02-18 DIAGNOSIS — M54.42 ACUTE LEFT-SIDED LOW BACK PAIN WITH LEFT-SIDED SCIATICA: Primary | ICD-10-CM

## 2022-02-18 DIAGNOSIS — M54.42 ACUTE LEFT-SIDED LOW BACK PAIN WITH LEFT-SIDED SCIATICA: ICD-10-CM

## 2022-02-18 PROCEDURE — 72110 X-RAY EXAM L-2 SPINE 4/>VWS: CPT

## 2022-02-18 PROCEDURE — 1036F TOBACCO NON-USER: CPT | Performed by: FAMILY MEDICINE

## 2022-02-18 PROCEDURE — 3008F BODY MASS INDEX DOCD: CPT | Performed by: FAMILY MEDICINE

## 2022-02-18 PROCEDURE — 99213 OFFICE O/P EST LOW 20 MIN: CPT | Performed by: FAMILY MEDICINE

## 2022-02-18 PROCEDURE — 73502 X-RAY EXAM HIP UNI 2-3 VIEWS: CPT

## 2022-02-18 RX ORDER — METHOCARBAMOL 750 MG/1
TABLET, FILM COATED ORAL
COMMUNITY
Start: 2022-02-18 | End: 2022-02-18 | Stop reason: ALTCHOICE

## 2022-02-18 RX ORDER — LIDOCAINE 50 MG/G
1 PATCH TOPICAL EVERY 24 HOURS
COMMUNITY
Start: 2022-02-17 | End: 2022-02-18 | Stop reason: SDUPTHER

## 2022-02-18 RX ORDER — CYCLOBENZAPRINE HCL 10 MG
10 TABLET ORAL
Qty: 14 TABLET | Refills: 0 | Status: SHIPPED | OUTPATIENT
Start: 2022-02-18

## 2022-02-18 RX ORDER — NAPROXEN 500 MG/1
500 TABLET ORAL 2 TIMES DAILY WITH MEALS
Qty: 30 TABLET | Refills: 0 | Status: SHIPPED | OUTPATIENT
Start: 2022-02-18

## 2022-02-18 RX ORDER — NAPROXEN 500 MG/1
TABLET ORAL
COMMUNITY
Start: 2022-02-18 | End: 2022-02-18 | Stop reason: SDUPTHER

## 2022-02-18 RX ORDER — LIDOCAINE 50 MG/G
1 PATCH TOPICAL DAILY
Qty: 5 PATCH | Refills: 0 | Status: SHIPPED | OUTPATIENT
Start: 2022-02-18 | End: 2022-02-23

## 2022-02-18 NOTE — PROGRESS NOTES
Assessment/Plan:    Acute left-sided low back pain with left-sided sciatica  - Discussed importance of maintaining daily activity and avoiding sedentary lifestyle which can worsen back pain  - Patient may continue to use tylenol and NSAIDs as needed for pain as well as lidocaine patches and warm compresses for 15-20 minutes TID  Patient may also use Flexeril 10mg QHS PRN   - Discussed physical therapy which patient is agreeable to at this time   - Will also obtain XR of the lumbar spine and left hip           Diagnoses and all orders for this visit:    Acute left-sided low back pain with left-sided sciatica  -     cyclobenzaprine (FLEXERIL) 10 mg tablet; Take 1 tablet (10 mg total) by mouth daily at bedtime  -     Ambulatory Referral to Physical Therapy; Future  -     XR spine lumbar minimum 4 views non injury; Future  -     XR hip/pelv 2-3 vws left if performed; Future  -     lidocaine (LIDODERM) 5 %; Apply 1 patch topically daily for 5 days  -     naproxen (NAPROSYN) 500 mg tablet; Take 1 tablet (500 mg total) by mouth 2 (two) times a day with meals    Screening for colon cancer  -     Ambulatory Referral to Gastroenterology; Future    Other orders  -     Discontinue: naproxen (NAPROSYN) 500 mg tablet;  (Patient not taking: Reported on 2/18/2022 )  -     Discontinue: lidocaine (LIDODERM) 5 %; Place 1 patch on the skin every 24 hours (Patient not taking: Reported on 2/18/2022 )  -     Discontinue: methocarbamol (ROBAXIN) 750 mg tablet          Subjective:      Patient ID: Katharine Arreaga is a 49 y o  male  HPI     Katharine rAreaga is a 49year old male who presents today accompanied with his wife Jazmine Johns for a follow up after being seen in the Emergency department yesterday for left sided back pain radiating down the left thigh  Patient was given a dose of percocet and robaxin in the Emergency department which did not help with the pain   He was also prescribed naproxen and lidocaine patch but has yet to pick those medications up from the pharmacy  Patient's wife did give him tylenol and ibuprofen this morning which is helping with the pain  Patient states that 15 years ago he had an episode of back pain and was told that he had a herniated disc  Patient reports that the back pain resolved following a course of physical therapy  Patient reports that this current episode started 2 weeks ago and denies any prior injury or trauma to the area  He does however report that he has been doing a lot of heavy lifting at work recently which may have triggered the back pain  The following portions of the patient's history were reviewed and updated as appropriate: allergies, current medications, past family history, past medical history, past social history, past surgical history and problem list     Review of Systems   Constitutional: Negative  HENT: Negative  Eyes: Negative  Respiratory: Negative  Cardiovascular: Negative  Gastrointestinal: Negative  Musculoskeletal: Positive for back pain  Skin: Negative  Neurological: Negative  Psychiatric/Behavioral: Negative  Objective:      /80 (BP Location: Left arm, Patient Position: Sitting, Cuff Size: Standard)   Pulse 82   Temp 98 °F (36 7 °C) (Skin)   Ht 6' 1" (1 854 m)   Wt 83 9 kg (185 lb)   SpO2 98%   BMI 24 41 kg/m²          Physical Exam  Constitutional:       General: He is not in acute distress  Appearance: Normal appearance  He is not ill-appearing  HENT:      Head: Normocephalic and atraumatic  Eyes:      General:         Right eye: No discharge  Left eye: No discharge  Extraocular Movements: Extraocular movements intact  Cardiovascular:      Rate and Rhythm: Normal rate  Heart sounds: Normal heart sounds  Pulmonary:      Effort: Pulmonary effort is normal  No respiratory distress  Breath sounds: No wheezing  Musculoskeletal:      Lumbar back: No tenderness or bony tenderness   Normal range of motion  Negative right straight leg raise test and negative left straight leg raise test       Right lower leg: No edema  Left lower leg: No edema  Comments: Pain with back extension, left lateral flexion and rotation to the left side  No tenderness to palpation of the groin area, mild tenderness overlying the trochanteric bursa  Negative BEATRICE and FADIR testing    Neurological:      General: No focal deficit present  Mental Status: He is alert  Sensory: No sensory deficit        Gait: Gait normal       Deep Tendon Reflexes: Reflexes normal    Psychiatric:         Mood and Affect: Mood normal          Behavior: Behavior normal

## 2022-02-18 NOTE — ASSESSMENT & PLAN NOTE
- Discussed importance of maintaining daily activity and avoiding sedentary lifestyle which can worsen back pain  - Patient may continue to use tylenol and NSAIDs as needed for pain as well as lidocaine patches and warm compresses for 15-20 minutes TID   Patient may also use Flexeril 10mg QHS PRN   - Discussed physical therapy which patient is agreeable to at this time   - Will also obtain XR of the lumbar spine and left hip

## 2022-02-18 NOTE — LETTER
February 18, 2022     Patient: Catrachito Crowell   YOB: 1970   Date of Visit: 2/18/2022       To Whom it May Concern:    Heena Hearn is under my professional care  He was seen in my office on 2/18/2022  He may return to work 2/21/2022 with limitations  Due to patient's current back injury, I recommend that he not engage in any activities that involve heavy lifting  If you have any questions or concerns, please don't hesitate to call           Sincerely,          Connie Boxer, MD

## 2022-02-24 ENCOUNTER — EVALUATION (OUTPATIENT)
Dept: PHYSICAL THERAPY | Facility: CLINIC | Age: 52
End: 2022-02-24
Payer: COMMERCIAL

## 2022-02-24 DIAGNOSIS — M54.42 ACUTE LEFT-SIDED LOW BACK PAIN WITH LEFT-SIDED SCIATICA: Primary | ICD-10-CM

## 2022-02-24 PROCEDURE — 97140 MANUAL THERAPY 1/> REGIONS: CPT

## 2022-02-24 PROCEDURE — 97161 PT EVAL LOW COMPLEX 20 MIN: CPT

## 2022-02-24 NOTE — PROGRESS NOTES
PT Evaluation     Today's date: 2022  Patient name: Beau Casas  : 1970  MRN: [id number]  Referring provider: Alena Rosales MD  Dx:   Encounter Diagnosis     ICD-10-CM    1  Acute left-sided low back pain with left-sided sciatica  M54 42 Ambulatory Referral to Physical Therapy                  Assessment  Assessment details: Beau Casas is a 49 y o  male presents with L thigh pain likely secondary to L SI joint dysfunction, functional LLD  LLD correctable with self MET  Following manual therapy SBL ROM improved to 20 deg prior to L thigh pain vs 8 deg at eval  Beau Casas has the above listed impairments and will benefit from skilled PT to improve deficits to return to prior level of function  Beau Casas was educated on eval findings and plan for management, safe body mechanics with lifting  HEP initiated  Jamaal Carroll would benefit from skilled services to improve ROM, strength, flexibility, and function, and to decrease pain  Pt pain free to end tx  Impairments: abnormal or restricted ROM, activity intolerance, impaired physical strength, lacks appropriate home exercise program, pain with function, poor posture  and poor body mechanics  Understanding of Dx/Px/POC: good   Prognosis: good    Goals  2 wks  - No functional LLD  - No pain > 5/10  - Increase strength 1/3 grade  - Increase lumbar ROM at least 10 deg where applicable    4-6 wks  - Pain 0-2/10  - Strength 5/5  - Lumbar ROM WFL and painfree  - Independent with HEP for self management  - Functional Status Measure at least 78 (score 63 at eval)  - Return to baseline tolerance for sleep, ambulation tolerance  - Safe RTW full 10 hour shifts      Plan  Plan details: Pt only attending PT 1x/wk due to high insurance co-pay    Patient would benefit from: skilled physical therapy  Planned modality interventions: thermotherapy: hydrocollator packs  Planned therapy interventions: abdominal trunk stabilization, joint mobilization, manual therapy, neuromuscular re-education, patient education, postural training, stretching, body mechanics training, therapeutic activities, strengthening, therapeutic exercise, flexibility and home exercise program  Frequency: 1x week  Duration in visits: 4  Duration in weeks: 4  Treatment plan discussed with: patient        Subjective Evaluation    History of Present Illness  Mechanism of injury: Pt reporting 1 week ago pt developed L thigh pain from hip to knee after completing work, insidious onset, "less than 3 hours later, the pain was so intense "  Pt is a , 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, "you can be really active during the day, between lifting, running around "  Had been doing shipping/receiving prior to L thigh pain onset  Notes PMH lumbar herniated disc 10 years ago, managed conservatively  Had L inguinal pain 3 wks prior to current episode  Last night had to spend 3-4 hours in bed due to L thigh pain  Went to ER due to pain, was issued meds "and just sent me on my way, they classified it as arthritis, they didn't even do an x-ray "  Then f/u with PCP, received x-rays, "they said it's arthritis of the lower back "  Chief complaint "left thigh pain down to the knee cap "  Pain does not descend distal to knee  Denies having bowel/bladder changes, saddle paresthesias  Notes paresthesias L distal 1/3 thigh anteriorly  Functional limitations: painful ambulation, difficulty sleeping when L thigh painful    Pain  Current pain ratin  At best pain ratin  At worst pain ratin  Location: L thigh hip to knee  Quality: sharp    Social Support  Steps to enter house: no  Stairs in house: yes   16  Lives in: multiple-level home  Lives with: spouse and adult children    Employment status: working (, 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)    Diagnostic Tests  X-ray: abnormal (L hip normal, lumbar with mild DDD, facet DJD)  Treatments  Current treatment: medication  Patient Goals  Patient goals for therapy: decreased pain, independence with ADLs/IADLs, return to sport/leisure activities and return to work  Patient goal: "Lose the pain from the hip to the knee"  RTW full 10 hours        Objective    Gait: no AD, normal     Posture/observation: increased lumbar lordosis, forward head and shoulders  OH squat: full squat, good balance, increased trunk flexion at hips  Lumbar ROM: flexion 80 deg, L hamstring stretch  RFIS no effect (NE)  Ext 8 deg, NE   RADHA with mild L anterior thigh pain, no worse  SBR 20 deg, mild L thigh discomfort  SBL 8 deg, immediate L thigh pain  R rotation 77 deg, NE   L rotation 64 deg, NE       MMT: glute medius: R 5/5, L 4/5  Glute max: R 4-/5, L 3+/5  Joint mobilizations: B UPA, PA L1-L5 with no effect  Neuro: pt able to heel walk, toe walk B  Lumbosacral myotomes 5/5 B to include iliospoas, quadriceps, anterior tibialis, extensor hallucis, peroneals, and hamstrings  DTR with B L3, S1 2/4  Special tests: L stand march test (+), R (-)  LLE long in supine, short in long sit  L ASIS inferior vs R  B pubic tubercles level  B SLR, Gaenslen's, thigh thrust, BEATRICE, FADIR (-)  Bernard test (+) B for hip flexor tightness  Palpation: L SI joint non-TTP  Flexibility: B gastroc, hamstrings moderately tight  B hip flexors moderately to severely tight  B Victorina's (-)             Precautions: none      Manuals 2/24/ 22            L lumbar rotation mob G3            L inom post rot mob G3                                      Neuro Re-Ed             Self MET for L inon ant rot 5"x3            L SKTC SCS 90"            TA cx nv                                                                Ther Ex             Short sit SBR 20"x5            Bridge             Clamshell L             H/L HS str             Wall gastroc str             Kneel HF str             Nu Step                          Ther Activity             T ball squat             FSU             LSU                                       Modalities

## 2022-03-02 ENCOUNTER — OFFICE VISIT (OUTPATIENT)
Dept: PHYSICAL THERAPY | Facility: CLINIC | Age: 52
End: 2022-03-02
Payer: COMMERCIAL

## 2022-03-02 DIAGNOSIS — M54.42 ACUTE LEFT-SIDED LOW BACK PAIN WITH LEFT-SIDED SCIATICA: Primary | ICD-10-CM

## 2022-03-02 PROCEDURE — 97110 THERAPEUTIC EXERCISES: CPT

## 2022-03-02 PROCEDURE — 97140 MANUAL THERAPY 1/> REGIONS: CPT

## 2022-03-02 NOTE — PROGRESS NOTES
Daily Note     Today's date: 3/2/2022  Patient name: Joana Martin  : 1970  MRN: [id number]  Referring provider: Manuela Walker MD  Dx:   Encounter Diagnosis     ICD-10-CM    1  Acute left-sided low back pain with left-sided sciatica  M54 42                   Subjective: pt reports having L thigh discomfort earlier today, currently pain free  Was unable to sleep on R side due to pain  Objective: See treatment diary below  Prior to treatment, no LLD and level ASIS  Transverse abdominis cx Poor via palpation  Assessment: Tolerated treatment well  Patient would benefit from continued PT  No functional LLD, currently pain free  HEP progressed  Plan: Continue per plan of care        Precautions: none      Manuals 2/24/  22 3/2           L lumbar rotation mob G3 G3           L inom post rot mob G3 G3                                     Neuro Re-Ed             Self MET for L inon ant rot 5"x3 -           L SKTC SCS 90" 90"           TA cx nv 10"x 15                                                               Ther Ex             Short sit SBR 20"x5 x5           Bridge  10"x 15           Clamshell L  10"x 15           H/L HS str  20"x5           Wall gastroc str  20"x5           Kneel HF str  20"x5           Nu Step  5'                        Ther Activity             T ball squat  2x10           FSU             LSU                                       Modalities

## 2022-03-09 ENCOUNTER — OFFICE VISIT (OUTPATIENT)
Dept: PHYSICAL THERAPY | Facility: CLINIC | Age: 52
End: 2022-03-09
Payer: COMMERCIAL

## 2022-03-09 DIAGNOSIS — M54.42 ACUTE LEFT-SIDED LOW BACK PAIN WITH LEFT-SIDED SCIATICA: Primary | ICD-10-CM

## 2022-03-09 PROCEDURE — 97110 THERAPEUTIC EXERCISES: CPT

## 2022-03-09 PROCEDURE — 97140 MANUAL THERAPY 1/> REGIONS: CPT

## 2022-03-09 PROCEDURE — 97530 THERAPEUTIC ACTIVITIES: CPT

## 2022-03-09 NOTE — PROGRESS NOTES
Daily Note     Today's date: 3/9/2022  Patient name: Juancarlos Corral  : 1970  MRN: [id number]  Referring provider: Benjamín Keane MD  Dx:   Encounter Diagnosis     ICD-10-CM    1  Acute left-sided low back pain with left-sided sciatica  M54 42                   Subjective: "I feel it today  I was standing on concrete all day, swinging hammers, lifting heavy " Pt c/o's L LE anterior hip/thigh and patella pain 3/10 at arrival        Objective: See treatment diary below  No functional LLD, B ASIS level  Assessment: Tolerated treatment well  Patient would benefit from continued PT For improved strength, flexibility and overall function  Focus on L LE today; R hip flexor stretch actually aggravated L anterior LE pain  L FSU proved challenging  Decreased pain following manual traction  Updated HEP; continue to progress/update HEP nv       Plan: Continue per plan of care        Precautions: none      Manuals 2/24/  22 3/2 3/9          L lumbar rotation mob G3 G3 G3 HJ          L inom post rot mob G3 G3 G3 HJ          Manual lumbar traction   5'                        Neuro Re-Ed             Self MET for L inon ant rot 5"x3 -           L SKTC SCS 90" 90" 90''          TA cx nv 10"x 15 HEP                                                              Ther Ex             Short sit SBR 20"x5 x5 x5          Bridge  10"x 15 x20          Clamshell L  10"x 15 x20          H/L HS str  20"x5 x5          Wall gastroc str  20"x5 x5          Kneel HF str  20"x5 x5 L only          Nu Step  5' 7'                        Ther Activity             T ball squat  2x10 3x10          FSU   4'' 2x10          LSU   nv                                    Modalities

## 2022-03-16 ENCOUNTER — OFFICE VISIT (OUTPATIENT)
Dept: PHYSICAL THERAPY | Facility: CLINIC | Age: 52
End: 2022-03-16
Payer: COMMERCIAL

## 2022-03-16 DIAGNOSIS — M54.42 ACUTE LEFT-SIDED LOW BACK PAIN WITH LEFT-SIDED SCIATICA: Primary | ICD-10-CM

## 2022-03-16 PROCEDURE — 97110 THERAPEUTIC EXERCISES: CPT

## 2022-03-16 PROCEDURE — 97140 MANUAL THERAPY 1/> REGIONS: CPT

## 2022-03-16 PROCEDURE — 97530 THERAPEUTIC ACTIVITIES: CPT

## 2022-03-16 NOTE — PROGRESS NOTES
Daily Note     Today's date: 3/16/2022  Patient name: Jayme Fox  : 1970  MRN: [id number]  Referring provider: Jese Arevalo MD  Dx:   Encounter Diagnosis     ICD-10-CM    1  Acute left-sided low back pain with left-sided sciatica  M54 42                   Subjective: "It's much better "   LBP 0/10  Has been largely pain free last several days, with no LLE pain or discomfort  Objective: See treatment diary below  Prior to treatment, no LLD and level ASIS  Assessment: Tolerated treatment well  Patient exhibited good technique with therapeutic exercises      Plan: Progress note during next visit  Potential discharge next visit       Precautions: none      Manuals 2/24/  22 3/2 3/9 3/16         L lumbar rotation mob G3 G3 G3 HJ G3         L inom post rot mob G3 G3 G3 HJ G3         Manual lumbar traction   5'  5'                      Neuro Re-Ed             Self MET for L inon ant rot 5"x3 -           L SKTC SCS 90" 90" 90'' 90"         TA cx nv 10"x 15 HEP                                                              Ther Ex             Short sit SBR 20"x5 x5 x5 x5         Bridge  10"x 15 x20 x25         Clamshell L  10"x 15 x20 x25         H/L HS str  20"x5 x5 x5         Wall gastroc str  20"x5 x5 x5         Kneel HF str  20"x5 x5 L only x5 B         Nu Step  5' 7'  10'                      Ther Activity             T ball squat  2x10 3x10 3x10         FSU   4'' 2x10 3x10         LSU   nv 4" 2x10                                   Modalities

## 2022-03-23 ENCOUNTER — EVALUATION (OUTPATIENT)
Dept: PHYSICAL THERAPY | Facility: CLINIC | Age: 52
End: 2022-03-23
Payer: COMMERCIAL

## 2022-03-23 DIAGNOSIS — M54.42 ACUTE LEFT-SIDED LOW BACK PAIN WITH LEFT-SIDED SCIATICA: Primary | ICD-10-CM

## 2022-03-23 PROCEDURE — 97110 THERAPEUTIC EXERCISES: CPT

## 2022-03-23 PROCEDURE — 97164 PT RE-EVAL EST PLAN CARE: CPT

## 2022-03-23 PROCEDURE — 97530 THERAPEUTIC ACTIVITIES: CPT

## 2022-03-23 PROCEDURE — 97140 MANUAL THERAPY 1/> REGIONS: CPT

## 2022-03-23 NOTE — PROGRESS NOTES
RE-EVALUATION:    Today's date: 3/23/2022  Patient name: Garrick Campo  : 1970  MRN: [id number]  Referring provider: Twana Boxer, MD  Dx:   Encounter Diagnosis     ICD-10-CM    1  Acute left-sided low back pain with left-sided sciatica  M54 [id number]                   Subjective: "The pain came back last Thursday (3/17/22), I don't know why "  Pain currently 1/10 L inguinal at times to L thigh 3-4/10  Pt/PT had discussed possible D/C to HEP today, now pt not sure given return of pain  Pt reports doing HEP  Pt has returned to full, normal work hours  RE: sleep, "I haven't noticed it, it hasn't affected my sleep "  "I felt it walking yesterday, but it only lasted two minutes  I didn't feel it walking today "  Pt had been painfree as of last tx 3/16/22  Objective: See treatment diary below    RE-EVALUATION:    Pain: 0-4/10 L inguinal, L thigh (1-8/10 L thigh at eval)    FOTO functional score 72, projected score 78  Score at eval 63  Higher score = higher function  Prior to treatment, no LLD and level ASIS  Lumbar ROM: flexion 80 deg, L hamstring stretch (same as eval)  Ext 20 deg (8 deg at eval)  SBR 20 deg, no effect (NE) (20 deg, L thigh discomfort at eval)  SBL 20 deg, L iliac crest mild pain (8 deg, L thigh pain at eval)  B rotation 71 deg (R rotation 77 deg, L 64 deg at eval)  MMT: B glute max, medius 5/5 (3+/5 to 5/5 at eval)          Assessment: Tolerated treatment well  Patient exhibited good technique with therapeutic exercises     Garrick Campo has been compliant with attending PT and home exercise program since initial eval   Keshav Cheek  has made improvements in objective data since initial eval but is still limited compared to prior level of function  Keshav Cheek continues with above listed impairments and would benefit from additional skilled PT to address these deficits to return to prior level of function  Keshav Cheek has attended 5 visits, missed 0 visits      Goals  2 wks  - No functional LLD - met  - No pain > 5/10 - met  - Increase strength 1/3 grade - met  - Increase lumbar ROM at least 10 deg where applicable - met    4-6 wks  - Pain 0-2/10 - not met  - Strength 5/5 - met  - Lumbar ROM WFL and painfree - met for ROM, not pain  - Independent with HEP for self management - met  - Functional Status Measure at least 78 (score 63 at eval) - not met  - Return to baseline tolerance for sleep, ambulation tolerance - met  - Safe RTW full 10 hour shifts - met but with pain    New Goals  2 wks  - No pain > 2/10    2-4 wks  - Pain 0-1/10  - Lumbar ROM WFL and painfree  - Functional Status Measure at least 78 (score 63 at eval)      Plan: Continue per plan of care  Pt attending PT 1x/wk due to high insurance co-pay  PT to continue 1x/wk PRN       Precautions: none      Manuals 2/24/ 22 3/2 3/9 3/16 3/23        L lumbar rotation mob G3 G3 G3 HJ G3 G3        L inom post rot mob G3 G3 G3 HJ G3 G3        Manual lumbar traction   5'  5' 5'                     Neuro Re-Ed             Self MET for L inon ant rot 5"x3 -           L SKTC SCS 90" 90" 90'' 90" 90"        TA cx nv 10"x 15 HEP                                                              Ther Ex             Short sit SBR 20"x5 x5 x5 x5 x5        Bridge  10"x 15 x20 x25         Clamshell L  10"x 15 x20 x25         H/L HS str  20"x5 x5 x5 x5 stand        Wall gastroc str  20"x5 x5 x5 x5        Kneel HF str  20"x5 x5 L only x5 B x5 B        Nu Step  5' 7'  10' 10'                     Ther Activity             T ball squat  2x10 3x10 3x10 3x10        FSU   4'' 2x10 3x10 3x10        LSU   nv 4" 2x10 3x10                                  Modalities

## 2022-03-30 ENCOUNTER — APPOINTMENT (OUTPATIENT)
Dept: PHYSICAL THERAPY | Facility: CLINIC | Age: 52
End: 2022-03-30
Payer: COMMERCIAL

## 2022-05-26 ENCOUNTER — VBI (OUTPATIENT)
Dept: ADMINISTRATIVE | Facility: OTHER | Age: 52
End: 2022-05-26

## 2022-08-02 ENCOUNTER — PREP FOR PROCEDURE (OUTPATIENT)
Dept: GASTROENTEROLOGY | Facility: AMBULARY SURGERY CENTER | Age: 52
End: 2022-08-02

## 2022-08-02 ENCOUNTER — TELEPHONE (OUTPATIENT)
Dept: GASTROENTEROLOGY | Facility: AMBULARY SURGERY CENTER | Age: 52
End: 2022-08-02

## 2022-08-02 DIAGNOSIS — Z12.11 SCREENING FOR COLON CANCER: Primary | ICD-10-CM

## 2022-08-02 DIAGNOSIS — Z12.11 SPECIAL SCREENING FOR MALIGNANT NEOPLASMS, COLON: Primary | ICD-10-CM

## 2022-08-02 NOTE — TELEPHONE ENCOUNTER
Patient is scheduled for colonoscopy on December 28 , 2022 at [address] [facility] with Ulysses Ramirez MD  Patient is aware of pre-procedure prep of Golytley/Dulcolax and they will be called the day prior between 2 and 6 pm for time to report for procedure  Pre-procedure prep has been given to the patient  via [address] mail and e-mail on August 2 , 2022

## 2022-09-16 ENCOUNTER — VBI (OUTPATIENT)
Dept: ADMINISTRATIVE | Facility: OTHER | Age: 52
End: 2022-09-16

## 2022-10-11 PROBLEM — Z12.11 SCREENING FOR COLON CANCER: Status: RESOLVED | Noted: 2022-02-18 | Resolved: 2022-10-11

## 2022-10-12 ENCOUNTER — IMMUNIZATIONS (OUTPATIENT)
Dept: FAMILY MEDICINE CLINIC | Facility: CLINIC | Age: 52
End: 2022-10-12
Payer: COMMERCIAL

## 2022-10-12 DIAGNOSIS — Z23 FLU VACCINE NEED: Primary | ICD-10-CM

## 2022-10-12 PROCEDURE — 90682 RIV4 VACC RECOMBINANT DNA IM: CPT

## 2022-10-12 PROCEDURE — 90471 IMMUNIZATION ADMIN: CPT

## 2022-12-19 ENCOUNTER — TELEPHONE (OUTPATIENT)
Dept: GASTROENTEROLOGY | Facility: CLINIC | Age: 52
End: 2022-12-19

## 2022-12-19 NOTE — TELEPHONE ENCOUNTER
Called pt to confirm procedure and make sure he has his prep  He states he has his instructions and will be picking up prep shortly  Prep was ordered 08/2022 - he will try to  in the next couple of days, so there is time to re-order if needed

## 2022-12-27 RX ORDER — SODIUM CHLORIDE 9 MG/ML
125 INJECTION, SOLUTION INTRAVENOUS CONTINUOUS
Status: CANCELLED | OUTPATIENT
Start: 2022-12-27

## 2022-12-28 ENCOUNTER — HOSPITAL ENCOUNTER (OUTPATIENT)
Dept: GASTROENTEROLOGY | Facility: MEDICAL CENTER | Age: 52
Setting detail: OUTPATIENT SURGERY
Discharge: HOME | End: 2022-12-28
Attending: INTERNAL MEDICINE | Admitting: INTERNAL MEDICINE

## 2022-12-28 ENCOUNTER — ANESTHESIA (OUTPATIENT)
Dept: GASTROENTEROLOGY | Facility: MEDICAL CENTER | Age: 52
End: 2022-12-28

## 2022-12-28 ENCOUNTER — ANESTHESIA EVENT (OUTPATIENT)
Dept: GASTROENTEROLOGY | Facility: MEDICAL CENTER | Age: 52
End: 2022-12-28

## 2022-12-28 VITALS
RESPIRATION RATE: 16 BRPM | TEMPERATURE: 98.3 F | SYSTOLIC BLOOD PRESSURE: 123 MMHG | DIASTOLIC BLOOD PRESSURE: 73 MMHG | HEART RATE: 72 BPM | OXYGEN SATURATION: 100 %

## 2022-12-28 DIAGNOSIS — Z12.11 SPECIAL SCREENING FOR MALIGNANT NEOPLASMS, COLON: ICD-10-CM

## 2022-12-28 RX ORDER — LIDOCAINE HYDROCHLORIDE 20 MG/ML
INJECTION, SOLUTION EPIDURAL; INFILTRATION; INTRACAUDAL; PERINEURAL AS NEEDED
Status: DISCONTINUED | OUTPATIENT
Start: 2022-12-28 | End: 2022-12-28

## 2022-12-28 RX ORDER — PROPOFOL 10 MG/ML
INJECTION, EMULSION INTRAVENOUS CONTINUOUS PRN
Status: DISCONTINUED | OUTPATIENT
Start: 2022-12-28 | End: 2022-12-28

## 2022-12-28 RX ORDER — SODIUM CHLORIDE 9 MG/ML
125 INJECTION, SOLUTION INTRAVENOUS CONTINUOUS
Status: DISCONTINUED | OUTPATIENT
Start: 2022-12-28 | End: 2023-01-01 | Stop reason: HOSPADM

## 2022-12-28 RX ORDER — PROPOFOL 10 MG/ML
INJECTION, EMULSION INTRAVENOUS AS NEEDED
Status: DISCONTINUED | OUTPATIENT
Start: 2022-12-28 | End: 2022-12-28

## 2022-12-28 RX ADMIN — LIDOCAINE HYDROCHLORIDE 60 MG: 20 INJECTION, SOLUTION EPIDURAL; INFILTRATION; INTRACAUDAL; PERINEURAL at 07:29

## 2022-12-28 RX ADMIN — PROPOFOL 100 MG: 10 INJECTION, EMULSION INTRAVENOUS at 07:30

## 2022-12-28 RX ADMIN — SODIUM CHLORIDE 125 ML/HR: 0.9 INJECTION, SOLUTION INTRAVENOUS at 07:17

## 2022-12-28 RX ADMIN — PROPOFOL 185 MCG/KG/MIN: 10 INJECTION, EMULSION INTRAVENOUS at 07:29

## 2022-12-28 RX ADMIN — PROPOFOL 100 MG: 10 INJECTION, EMULSION INTRAVENOUS at 07:29

## 2022-12-28 NOTE — ANESTHESIA PREPROCEDURE EVALUATION
Procedure:  COLONOSCOPY    Relevant Problems   MUSCULOSKELETAL   (+) Acute left-sided low back pain with left-sided sciatica      Other   (+) Screening for colon cancer (Resolved)        Physical Exam    Airway  Comment: + beard  Mallampati score: III  TM Distance: >3 FB  Neck ROM: full     Dental   No notable dental hx     Cardiovascular  Rhythm: regular, Rate: normal,     Pulmonary  Breath sounds clear to auscultation,     Other Findings        Anesthesia Plan  ASA Score- 1     Anesthesia Type- IV sedation with anesthesia with ASA Monitors  Additional Monitors:   Airway Plan:           Plan Factors-Exercise tolerance (METS): >4 METS  Chart reviewed  Patient is not a current smoker  Obstructive sleep apnea risk education given perioperatively  Induction- intravenous  Postoperative Plan-     Informed Consent- Anesthetic plan and risks discussed with patient

## 2022-12-28 NOTE — H&P
History and Physical -  [facility]  Gail Woods 47 y o  male MRN: [id number]                  HPI: Hiram Ramires is a 47y o  year old male who presents for colonoscopy for colon cancer screening  REVIEW OF SYSTEMS: Per the HPI, and otherwise unremarkable  Historical Information   No past medical history on file  No past surgical history on file  Social History   Social History     Substance and Sexual Activity   Alcohol Use Yes   • Alcohol/week: 1 0 standard drink   • Types: 1 Cans of beer per week     Social History     Substance and Sexual Activity   Drug Use No     Social History     Tobacco Use   Smoking Status Never   Smokeless Tobacco Never     Family History   Problem Relation Age of Onset   • Cancer Mother        Meds/Allergies       Current Outpatient Medications:   •  bisacodyl (DULCOLAX) 5 mg EC tablet  •  cyclobenzaprine (FLEXERIL) 10 mg tablet  •  ibuprofen (ADVIL,MOTRIN) 100 MG tablet  •  ipratropium (ATROVENT) 0 03 % nasal spray  •  lidocaine (LIDODERM) 5 %  •  naproxen (NAPROSYN) 500 mg tablet  •  polyethylene glycol (GOLYTELY) 4000 mL solution    No Known Allergies    Objective     There were no vitals taken for this visit  PHYSICAL EXAM    Gen: NAD  Head: NCAT  CV: RRR  CHEST: Clear  ABD: soft, NT/ND  EXT: no edema      ASSESSMENT/PLAN:  : Hiram Ramires is a 47y o  year old male who presents for colonoscopy for colon cancer screening  The patient is stable and optimized for the procedure, we reviewed risk and benefits  Risk include but not limited to infection, bleeding, perforation and missing a lesion

## 2022-12-28 NOTE — ANESTHESIA POSTPROCEDURE EVALUATION
Post-Op Assessment Note    CV Status:  Stable    Pain management: adequate     Mental Status:  Awake   Hydration Status:  Stable   PONV Controlled:  Controlled   Airway Patency:  Patent      Post Op Vitals Reviewed: Yes      Staff: Anesthesiologist         No notable events documented      BP 99/57 (12/28/22 0751)    Temp      Pulse 70 (12/28/22 0751)   Resp 16 (12/28/22 0751)    SpO2 97 % (12/28/22 0751)

## 2023-10-10 NOTE — DISCHARGE INSTRUCTIONS
Colonoscopy   WHAT YOU NEED TO KNOW:   A colonoscopy is a procedure to examine the inside of your colon (intestine) with a scope  Polyps or tissue growths may have been removed during your colonoscopy  It is normal to feel bloated and to have some abdominal discomfort  You should be passing gas  If you have hemorrhoids or you had polyps removed, you may have a small amount of bleeding  DISCHARGE INSTRUCTIONS:   Seek care immediately if:   You have sudden, severe abdominal pain  You have problems swallowing  You have a large amount of black, sticky bowel movements or blood in your bowel movements  You have sudden trouble breathing  You feel weak, lightheaded, or faint or your heart beats faster than normal for you  Contact your healthcare provider if:   You have a fever and chills  You have nausea or are vomiting  Your abdomen is bloated or feels full and hard  You have abdominal pain  You have black, sticky bowel movements or blood in your bowel movements  You have not had a bowel movement for 3 days after your procedure  You have rash or hives  You have questions or concerns about your procedure  Activity:   Do not lift, strain, or run for 24 hours after your procedure  Rest after your procedure  You have been given medicine to relax you  Do not drive or make important decisions until the day after your procedure  Return to your normal activity as directed  Relieve gas and discomfort from bloating by lying on your right side with a heating pad on your abdomen  You may need to take short walks to help the gas move out  Eat small meals until bloating is relieved  Follow up with your healthcare provider as directed: Write down your questions so you remember to ask them during your visits  If you take a “blood thinner”, please review the specific instructions from your endoscopist about when you should resume it   These can be found in the “Recommendation” and “Your Medication list” sections of this After Visit Summary  8

## 2023-12-26 ENCOUNTER — OFFICE VISIT (OUTPATIENT)
Dept: FAMILY MEDICINE CLINIC | Facility: CLINIC | Age: 53
End: 2023-12-26
Payer: COMMERCIAL

## 2023-12-26 VITALS
OXYGEN SATURATION: 100 % | DIASTOLIC BLOOD PRESSURE: 62 MMHG | HEIGHT: 73 IN | BODY MASS INDEX: 26.51 KG/M2 | HEART RATE: 79 BPM | SYSTOLIC BLOOD PRESSURE: 111 MMHG | WEIGHT: 200 LBS | TEMPERATURE: 97.5 F

## 2023-12-26 DIAGNOSIS — Z12.5 SCREENING FOR PROSTATE CANCER: ICD-10-CM

## 2023-12-26 DIAGNOSIS — Z00.00 WELL ADULT EXAM: Primary | ICD-10-CM

## 2023-12-26 DIAGNOSIS — B35.4 TINEA CORPORIS: ICD-10-CM

## 2023-12-26 PROCEDURE — 99396 PREV VISIT EST AGE 40-64: CPT | Performed by: FAMILY MEDICINE

## 2023-12-26 RX ORDER — CLOTRIMAZOLE AND BETAMETHASONE DIPROPIONATE 10; .64 MG/G; MG/G
CREAM TOPICAL 2 TIMES DAILY
Qty: 45 G | Refills: 1 | Status: SHIPPED | OUTPATIENT
Start: 2023-12-26

## 2023-12-26 NOTE — PROGRESS NOTES
"Assessment/Plan: Likely tinea infection.  Consider dermatology evaluation if symptoms recur.  We talked about the possibility of atypical psoriasis.  Patient will call with any new persisting or worsening symptoms.  He is due for annual lab testing as well.     1. Tinea corporis  -     clotrimazole-betamethasone (LOTRISONE) 1-0.05 % cream; Apply topically 2 (two) times a day    2. Screening for prostate cancer  -     PSA, Total Screen; Future    3. Well adult exam  -     CBC and differential  -     Comprehensive metabolic panel  -     Hemoglobin A1C  -     Lipid Panel with Direct LDL reflex          Subjective:      Patient ID: Mahad Woods is a 53 y.o. male.    Patient is here today with wife.  Patient with rash to the bilateral anterior legs.  Onset 1 to 2 weeks ago.  Rash is not itchy.          Depression Screening and Follow-up Plan: Patient was screened for depression during today's encounter. They screened negative with a PHQ-2 score of 0.        The following portions of the patient's history were reviewed and updated as appropriate: allergies, current medications, past family history, past medical history, past social history, past surgical history, and problem list.    Review of Systems   Constitutional: Negative.    HENT: Negative.     Eyes: Negative.    Respiratory: Negative.     Cardiovascular: Negative.    Gastrointestinal: Negative.    Endocrine: Negative.    Genitourinary: Negative.    Musculoskeletal: Negative.    Skin:  Positive for rash.   Allergic/Immunologic: Negative.    Neurological: Negative.    Hematological: Negative.    Psychiatric/Behavioral: Negative.           Objective:      /62 (BP Location: Left arm, Patient Position: Sitting, Cuff Size: Large)   Pulse 79   Temp 97.5 °F (36.4 °C) (Tympanic)   Ht 6' 1\" (1.854 m)   Wt 90.7 kg (200 lb)   SpO2 100%   BMI 26.39 kg/m²          Physical Exam  Vitals reviewed.   Constitutional:       Appearance: He is well-developed.   HENT:    "   Head: Normocephalic and atraumatic.      Right Ear: External ear normal. Tympanic membrane is not erythematous or bulging.      Left Ear: External ear normal. Tympanic membrane is not erythematous or bulging.      Nose: Nose normal.      Mouth/Throat:      Mouth: No oral lesions.      Pharynx: No oropharyngeal exudate.   Eyes:      General: No scleral icterus.        Right eye: No discharge.         Left eye: No discharge.      Conjunctiva/sclera: Conjunctivae normal.   Neck:      Thyroid: No thyromegaly.   Cardiovascular:      Rate and Rhythm: Normal rate and regular rhythm.      Heart sounds: Normal heart sounds. No murmur heard.     No friction rub. No gallop.   Pulmonary:      Effort: Pulmonary effort is normal. No respiratory distress.      Breath sounds: No wheezing or rales.   Chest:      Chest wall: No tenderness.   Abdominal:      General: Bowel sounds are normal. There is no distension.      Palpations: Abdomen is soft. There is no mass.      Tenderness: There is no abdominal tenderness. There is no guarding or rebound.   Musculoskeletal:         General: No tenderness or deformity. Normal range of motion.      Cervical back: Normal range of motion and neck supple.   Lymphadenopathy:      Cervical: No cervical adenopathy.   Skin:     General: Skin is warm and dry.      Coloration: Skin is not pale.      Findings: No erythema or rash (2 patches to the left anterior shin and 1 to the right that are well-circumscribed and scaly appearing.  No central clearing.  Rashes are 2 to 3 cm in diameter.  Well-circumscribed.).   Neurological:      Mental Status: He is alert and oriented to person, place, and time.      Cranial Nerves: No cranial nerve deficit.      Motor: No abnormal muscle tone.      Coordination: Coordination normal.      Deep Tendon Reflexes: Reflexes are normal and symmetric.   Psychiatric:         Behavior: Behavior normal.

## 2023-12-28 ENCOUNTER — LAB (OUTPATIENT)
Dept: LAB | Facility: CLINIC | Age: 53
End: 2023-12-28
Payer: COMMERCIAL

## 2023-12-28 DIAGNOSIS — Z12.5 SCREENING FOR PROSTATE CANCER: ICD-10-CM

## 2023-12-28 LAB
ALBUMIN SERPL BCP-MCNC: 4.2 G/DL (ref 3.5–5)
ALP SERPL-CCNC: 55 U/L (ref 34–104)
ALT SERPL W P-5'-P-CCNC: 22 U/L (ref 7–52)
ANION GAP SERPL CALCULATED.3IONS-SCNC: 3 MMOL/L
AST SERPL W P-5'-P-CCNC: 21 U/L (ref 13–39)
BASOPHILS # BLD AUTO: 0.05 THOUSANDS/ÂΜL (ref 0–0.1)
BASOPHILS NFR BLD AUTO: 1 % (ref 0–1)
BILIRUB SERPL-MCNC: 0.55 MG/DL (ref 0.2–1)
BUN SERPL-MCNC: 21 MG/DL (ref 5–25)
CALCIUM SERPL-MCNC: 9.4 MG/DL (ref 8.4–10.2)
CHLORIDE SERPL-SCNC: 106 MMOL/L (ref 96–108)
CHOLEST SERPL-MCNC: 200 MG/DL
CO2 SERPL-SCNC: 31 MMOL/L (ref 21–32)
CREAT SERPL-MCNC: 1 MG/DL (ref 0.6–1.3)
EOSINOPHIL # BLD AUTO: 0.13 THOUSAND/ÂΜL (ref 0–0.61)
EOSINOPHIL NFR BLD AUTO: 2 % (ref 0–6)
ERYTHROCYTE [DISTWIDTH] IN BLOOD BY AUTOMATED COUNT: 12.5 % (ref 11.6–15.1)
EST. AVERAGE GLUCOSE BLD GHB EST-MCNC: 123 MG/DL
GFR SERPL CREATININE-BSD FRML MDRD: 86 ML/MIN/1.73SQ M
GLUCOSE P FAST SERPL-MCNC: 93 MG/DL (ref 65–99)
HBA1C MFR BLD: 5.9 %
HCT VFR BLD AUTO: 47.4 % (ref 36.5–49.3)
HDLC SERPL-MCNC: 78 MG/DL
HGB BLD-MCNC: 15.2 G/DL (ref 12–17)
IMM GRANULOCYTES # BLD AUTO: 0.02 THOUSAND/UL (ref 0–0.2)
IMM GRANULOCYTES NFR BLD AUTO: 0 % (ref 0–2)
LDLC SERPL CALC-MCNC: 108 MG/DL (ref 0–100)
LYMPHOCYTES # BLD AUTO: 1.99 THOUSANDS/ÂΜL (ref 0.6–4.47)
LYMPHOCYTES NFR BLD AUTO: 34 % (ref 14–44)
MCH RBC QN AUTO: 30.8 PG (ref 26.8–34.3)
MCHC RBC AUTO-ENTMCNC: 32.1 G/DL (ref 31.4–37.4)
MCV RBC AUTO: 96 FL (ref 82–98)
MONOCYTES # BLD AUTO: 0.51 THOUSAND/ÂΜL (ref 0.17–1.22)
MONOCYTES NFR BLD AUTO: 9 % (ref 4–12)
NEUTROPHILS # BLD AUTO: 3.13 THOUSANDS/ÂΜL (ref 1.85–7.62)
NEUTS SEG NFR BLD AUTO: 54 % (ref 43–75)
NRBC BLD AUTO-RTO: 0 /100 WBCS
PLATELET # BLD AUTO: 283 THOUSANDS/UL (ref 149–390)
PMV BLD AUTO: 9.6 FL (ref 8.9–12.7)
POTASSIUM SERPL-SCNC: 4.3 MMOL/L (ref 3.5–5.3)
PROT SERPL-MCNC: 7.3 G/DL (ref 6.4–8.4)
PSA SERPL-MCNC: 1.81 NG/ML (ref 0–4)
RBC # BLD AUTO: 4.93 MILLION/UL (ref 3.88–5.62)
SODIUM SERPL-SCNC: 140 MMOL/L (ref 135–147)
TRIGL SERPL-MCNC: 68 MG/DL
WBC # BLD AUTO: 5.83 THOUSAND/UL (ref 4.31–10.16)

## 2023-12-28 PROCEDURE — 36415 COLL VENOUS BLD VENIPUNCTURE: CPT

## 2023-12-28 PROCEDURE — G0103 PSA SCREENING: HCPCS

## 2023-12-28 PROCEDURE — 83036 HEMOGLOBIN GLYCOSYLATED A1C: CPT | Performed by: FAMILY MEDICINE

## 2023-12-28 PROCEDURE — 80053 COMPREHEN METABOLIC PANEL: CPT | Performed by: FAMILY MEDICINE

## 2023-12-28 PROCEDURE — 80061 LIPID PANEL: CPT | Performed by: FAMILY MEDICINE

## 2023-12-28 PROCEDURE — 85025 COMPLETE CBC W/AUTO DIFF WBC: CPT | Performed by: FAMILY MEDICINE

## 2024-03-06 ENCOUNTER — OFFICE VISIT (OUTPATIENT)
Dept: FAMILY MEDICINE CLINIC | Facility: CLINIC | Age: 54
End: 2024-03-06
Payer: COMMERCIAL

## 2024-03-06 VITALS
WEIGHT: 201 LBS | HEIGHT: 73 IN | HEART RATE: 75 BPM | OXYGEN SATURATION: 100 % | BODY MASS INDEX: 26.64 KG/M2 | TEMPERATURE: 96.1 F | SYSTOLIC BLOOD PRESSURE: 124 MMHG | DIASTOLIC BLOOD PRESSURE: 72 MMHG

## 2024-03-06 DIAGNOSIS — Z11.4 SCREENING FOR HIV (HUMAN IMMUNODEFICIENCY VIRUS): ICD-10-CM

## 2024-03-06 DIAGNOSIS — Z11.59 NEED FOR HEPATITIS C SCREENING TEST: ICD-10-CM

## 2024-03-06 DIAGNOSIS — M79.605 PAIN OF LEFT LOWER EXTREMITY: Primary | ICD-10-CM

## 2024-03-06 DIAGNOSIS — I83.92 VARICOSE VEINS OF LEFT LOWER EXTREMITY, UNSPECIFIED WHETHER COMPLICATED: ICD-10-CM

## 2024-03-06 PROCEDURE — 99214 OFFICE O/P EST MOD 30 MIN: CPT | Performed by: FAMILY MEDICINE

## 2024-03-06 RX ORDER — NAPROXEN 500 MG/1
500 TABLET ORAL 2 TIMES DAILY WITH MEALS
Qty: 60 TABLET | Refills: 5 | Status: SHIPPED | OUTPATIENT
Start: 2024-03-06

## 2024-03-07 NOTE — PROGRESS NOTES
Assessment/Plan: Patient likely has a traumatic phlebitis.  Recommend use of naproxen twice daily over the next 1 to 2 weeks.  Recommend venous duplex to rule out DVT.  Patient will call with any new persisting or worsening symptoms.  We will treat if DVT present.  He will call if any new symptoms develop.     1. Pain of left lower extremity  -     VAS VENOUS DUPLEX -LOWER LIMB UNILATERAL; Future; Expected date: 03/06/2024  -     naproxen (Naprosyn) 500 mg tablet; Take 1 tablet (500 mg total) by mouth 2 (two) times a day with meals For leg pain    2. Need for hepatitis C screening test  -     Hepatitis C Antibody; Future    3. Screening for HIV (human immunodeficiency virus)  -     HIV 1/2 AG/AB w Reflex [facility] for 2 yr old and above; Future    4. Varicose veins of left lower extremity, unspecified whether complicated          Subjective:      Patient ID: Mahad Woods is a 53 y.o. male.    Patient presents today with pain to the left anterior lower extremity for the last 1 week.  He banged his left leg at home.  Denies any calf pain.  No shortness of breath.  He does have history of varicose veins.    Leg Pain              The following portions of the patient's history were reviewed and updated as appropriate: allergies, current medications, past family history, past medical history, past social history, past surgical history, and problem list.    Review of Systems   Constitutional: Negative.    HENT: Negative.     Eyes: Negative.    Respiratory: Negative.     Cardiovascular: Negative.    Gastrointestinal: Negative.    Endocrine: Negative.    Genitourinary: Negative.    Musculoskeletal:  Positive for myalgias.   Skin: Negative.    Allergic/Immunologic: Negative.    Neurological: Negative.    Hematological: Negative.    Psychiatric/Behavioral: Negative.           Objective:      /72 (BP Location: Left arm, Patient Position: Sitting, Cuff Size: Large)   Pulse 75   Temp (!) 96.1 °F (35.6 °C) (Tympanic)   Ht  "6' 1\" (1.854 m)   Wt 91.2 kg (201 lb)   SpO2 100%   BMI 26.52 kg/m²          Physical Exam  Vitals reviewed.   Constitutional:       Appearance: He is well-developed.   HENT:      Head: Normocephalic and atraumatic.      Right Ear: External ear normal. Tympanic membrane is not erythematous or bulging.      Left Ear: External ear normal. Tympanic membrane is not erythematous or bulging.      Nose: Nose normal.      Mouth/Throat:      Mouth: No oral lesions.      Pharynx: No oropharyngeal exudate.   Eyes:      General: No scleral icterus.        Right eye: No discharge.         Left eye: No discharge.      Conjunctiva/sclera: Conjunctivae normal.   Neck:      Thyroid: No thyromegaly.   Cardiovascular:      Rate and Rhythm: Normal rate and regular rhythm.      Heart sounds: Normal heart sounds. No murmur heard.     No friction rub. No gallop.   Pulmonary:      Effort: Pulmonary effort is normal. No respiratory distress.      Breath sounds: No wheezing or rales.   Chest:      Chest wall: No tenderness.   Abdominal:      General: Bowel sounds are normal. There is no distension.      Palpations: Abdomen is soft. There is no mass.      Tenderness: There is no abdominal tenderness. There is no guarding or rebound.   Musculoskeletal:         General: No tenderness or deformity. Normal range of motion.      Cervical back: Normal range of motion and neck supple.   Lymphadenopathy:      Cervical: No cervical adenopathy.   Skin:     General: Skin is warm and dry.      Coloration: Skin is not pale.      Findings: No erythema or rash.      Comments: Varicosities present to the anterior lower leg.  No pain to palpation.   Neurological:      Mental Status: He is alert and oriented to person, place, and time.      Cranial Nerves: No cranial nerve deficit.      Motor: No abnormal muscle tone.      Coordination: Coordination normal.      Deep Tendon Reflexes: Reflexes are normal and symmetric.   Psychiatric:         Behavior: " Behavior normal.

## 2024-03-08 ENCOUNTER — HOSPITAL ENCOUNTER (OUTPATIENT)
Dept: NON INVASIVE DIAGNOSTICS | Facility: HOSPITAL | Age: 54
Discharge: HOME | End: 2024-03-08
Payer: COMMERCIAL

## 2024-03-08 DIAGNOSIS — M79.605 PAIN OF LEFT LOWER EXTREMITY: ICD-10-CM

## 2024-03-08 PROCEDURE — 93971 EXTREMITY STUDY: CPT

## 2024-03-09 PROCEDURE — 93971 EXTREMITY STUDY: CPT | Performed by: SURGERY
